# Patient Record
Sex: MALE | Race: WHITE | NOT HISPANIC OR LATINO
[De-identification: names, ages, dates, MRNs, and addresses within clinical notes are randomized per-mention and may not be internally consistent; named-entity substitution may affect disease eponyms.]

---

## 2018-05-16 ENCOUNTER — APPOINTMENT (OUTPATIENT)
Dept: ORTHOPEDIC SURGERY | Facility: CLINIC | Age: 68
End: 2018-05-16
Payer: MEDICARE

## 2018-05-16 VITALS — HEIGHT: 72 IN | BODY MASS INDEX: 27.77 KG/M2 | WEIGHT: 205 LBS

## 2018-05-16 VITALS — DIASTOLIC BLOOD PRESSURE: 76 MMHG | HEART RATE: 93 BPM | SYSTOLIC BLOOD PRESSURE: 121 MMHG

## 2018-05-16 PROCEDURE — 99203 OFFICE O/P NEW LOW 30 MIN: CPT

## 2018-05-23 ENCOUNTER — APPOINTMENT (OUTPATIENT)
Dept: NEUROSURGERY | Facility: CLINIC | Age: 68
End: 2018-05-23

## 2018-05-30 ENCOUNTER — APPOINTMENT (OUTPATIENT)
Dept: PHYSICAL MEDICINE AND REHAB | Facility: CLINIC | Age: 68
End: 2018-05-30

## 2022-06-30 ENCOUNTER — APPOINTMENT (OUTPATIENT)
Dept: UROLOGY | Facility: CLINIC | Age: 72
End: 2022-06-30

## 2022-06-30 VITALS
WEIGHT: 195 LBS | HEIGHT: 72 IN | HEART RATE: 84 BPM | SYSTOLIC BLOOD PRESSURE: 146 MMHG | DIASTOLIC BLOOD PRESSURE: 81 MMHG | BODY MASS INDEX: 26.41 KG/M2

## 2022-06-30 DIAGNOSIS — Z00.00 ENCOUNTER FOR GENERAL ADULT MEDICAL EXAMINATION W/OUT ABNORMAL FINDINGS: ICD-10-CM

## 2022-06-30 DIAGNOSIS — Z78.9 OTHER SPECIFIED HEALTH STATUS: ICD-10-CM

## 2022-06-30 PROCEDURE — 99204 OFFICE O/P NEW MOD 45 MIN: CPT

## 2022-06-30 RX ORDER — LIDOCAINE 5% 700 MG/1
5 PATCH TOPICAL
Qty: 90 | Refills: 0 | Status: ACTIVE | COMMUNITY
Start: 2021-09-23

## 2022-07-02 LAB
ANION GAP SERPL CALC-SCNC: 14 MMOL/L
BUN SERPL-MCNC: 15 MG/DL
CALCIUM SERPL-MCNC: 10.2 MG/DL
CHLORIDE SERPL-SCNC: 99 MMOL/L
CO2 SERPL-SCNC: 28 MMOL/L
CREAT SERPL-MCNC: 1.44 MG/DL
EGFR: 51 ML/MIN/1.73M2
ESTRADIOL SERPL-MCNC: 58 PG/ML
GLUCOSE SERPL-MCNC: 124 MG/DL
LH SERPL-ACNC: 0.3 IU/L
POTASSIUM SERPL-SCNC: 4.5 MMOL/L
PROLACTIN SERPL-MCNC: 11.9 NG/ML
PSA FREE FLD-MCNC: 15 %
PSA FREE SERPL-MCNC: 0.7 NG/ML
PSA SERPL-MCNC: 4.51 NG/ML
SODIUM SERPL-SCNC: 141 MMOL/L

## 2022-07-02 NOTE — ADDENDUM
[FreeTextEntry1] : Entered by Sarah Ron, acting as scribe for Dr. Orlando Lewis.\par \par The documentation recorded by the scribe accurately reflects the service I personally performed and the decisions made by me.

## 2022-07-02 NOTE — LETTER BODY
[FreeTextEntry1] : This patient does not have a PCP. \par \par Reason for Visit: Elevated PSA.\par \par This is a 72 year-old gentleman with a history of chronic back pain and hypogonadism presenting with elevated PSA. The patient notes he is currently on testosterone replacement. He also reports taking Fentanyl for his chronic back pain. The patient reports that he has not had previous prostate biopsy. His current PSA is unknown. The patient denies any hematuria or lower urinary tract symptoms. He denies any aggravating or relieving factors. The patient denies any interference of function. All other review of systems are negative. He has lung cancer in his family medical history. He has previous surgical history. Past medical history, family history and social history were inquired and were noncontributory to current condition. The patient does not drink alcohol. He was a former smoker. Medications and allergies were reviewed. He has no known allergies to medication. \par \par On examination, the patient is in no acute distress. He is alert and oriented follows commands. He has normal mood and affect. He is normocephalic. Neck is supple. Oral no thrush. Respirations are unlabored. His abdomen is soft and nontender. Bladder is nonpalpable. No CVA tenderness. Neurologically he is grossly intact. No peripheral edema. Skin without gross abnormality. He has normal male external genitalia. Normal meatus. Bilateral testes are descended intrascrotally and normal to palpation. On rectal examination, there is normal sphincter tone. The prostate is clinically benign without focal induration or nodularity.\par \par Assessment: Elevated PSA.\par \par I counseled the patient. I discussed with him the risk of occult malignancy. I discussed the various etiologies of PSA elevation, including enlargement of prostate, inflammation or infection, and prostate cancer. He would like to confirm diagnosis by repeating PSA. He will also obtain BMP today. If his PSA remains elevated, then he will undergo prostate MRI for fusion targeted prostate biopsy. I counseled the patient regarding the procedure. The risks and benefits were discussed. Alternatives were given. I answered the patient questions. The patient will take the necessary preparations for the procedure, including fleet edema. The patient will follow-up as directed and will contact me with any questions or concerns. Thank you for the opportunity to participate in the care of this patient, I will keep you updated on his progress. \par \par Plan: Repeat PSA. BMP. Prostate MRI. Fleet edema. Follow up as directed.

## 2022-07-03 LAB
TESTOST FREE SERPL-MCNC: 11.5 PG/ML
TESTOST SERPL-MCNC: 995 NG/DL

## 2022-08-08 ENCOUNTER — OUTPATIENT (OUTPATIENT)
Dept: OUTPATIENT SERVICES | Facility: HOSPITAL | Age: 72
LOS: 1 days | End: 2022-08-08
Payer: MEDICARE

## 2022-08-08 ENCOUNTER — APPOINTMENT (OUTPATIENT)
Dept: MRI IMAGING | Facility: CLINIC | Age: 72
End: 2022-08-08

## 2022-08-08 DIAGNOSIS — R97.20 ELEVATED PROSTATE SPECIFIC ANTIGEN [PSA]: ICD-10-CM

## 2022-08-08 DIAGNOSIS — M54.9 DORSALGIA, UNSPECIFIED: ICD-10-CM

## 2022-08-08 DIAGNOSIS — Z00.00 ENCOUNTER FOR GENERAL ADULT MEDICAL EXAMINATION WITHOUT ABNORMAL FINDINGS: ICD-10-CM

## 2022-08-08 PROCEDURE — 72197 MRI PELVIS W/O & W/DYE: CPT

## 2022-08-08 PROCEDURE — 72197 MRI PELVIS W/O & W/DYE: CPT | Mod: 26,MH

## 2022-08-08 PROCEDURE — A9585: CPT

## 2022-08-12 ENCOUNTER — NON-APPOINTMENT (OUTPATIENT)
Age: 72
End: 2022-08-12

## 2022-08-13 DIAGNOSIS — M54.2 CERVICALGIA: ICD-10-CM

## 2022-08-19 ENCOUNTER — OUTPATIENT (OUTPATIENT)
Dept: OUTPATIENT SERVICES | Facility: HOSPITAL | Age: 72
LOS: 1 days | End: 2022-08-19
Payer: MEDICARE

## 2022-08-19 DIAGNOSIS — Z00.8 ENCOUNTER FOR OTHER GENERAL EXAMINATION: ICD-10-CM

## 2022-08-19 PROCEDURE — 76377 3D RENDER W/INTRP POSTPROCES: CPT | Mod: 26

## 2022-08-19 PROCEDURE — C8001: CPT

## 2022-08-22 ENCOUNTER — NON-APPOINTMENT (OUTPATIENT)
Age: 72
End: 2022-08-22

## 2022-08-28 DIAGNOSIS — M54.9 DORSALGIA, UNSPECIFIED: ICD-10-CM

## 2022-08-29 ENCOUNTER — APPOINTMENT (OUTPATIENT)
Dept: UROLOGY | Facility: CLINIC | Age: 72
End: 2022-08-29

## 2022-08-29 ENCOUNTER — OUTPATIENT (OUTPATIENT)
Dept: OUTPATIENT SERVICES | Facility: HOSPITAL | Age: 72
LOS: 1 days | End: 2022-08-29
Payer: MEDICARE

## 2022-08-29 VITALS
BODY MASS INDEX: 26.41 KG/M2 | WEIGHT: 195 LBS | DIASTOLIC BLOOD PRESSURE: 72 MMHG | SYSTOLIC BLOOD PRESSURE: 123 MMHG | HEIGHT: 72 IN | HEART RATE: 62 BPM | RESPIRATION RATE: 17 BRPM | OXYGEN SATURATION: 98 %

## 2022-08-29 VITALS — SYSTOLIC BLOOD PRESSURE: 147 MMHG | DIASTOLIC BLOOD PRESSURE: 70 MMHG | HEART RATE: 76 BPM

## 2022-08-29 DIAGNOSIS — R35.0 FREQUENCY OF MICTURITION: ICD-10-CM

## 2022-08-29 DIAGNOSIS — R97.20 ELEVATED PROSTATE SPECIFIC ANTIGEN [PSA]: ICD-10-CM

## 2022-08-29 PROCEDURE — 76872 US TRANSRECTAL: CPT

## 2022-08-29 PROCEDURE — 55700: CPT

## 2022-08-29 PROCEDURE — 76942 ECHO GUIDE FOR BIOPSY: CPT | Mod: 26,59

## 2022-08-29 PROCEDURE — 76872 US TRANSRECTAL: CPT | Mod: 26

## 2022-08-29 PROCEDURE — 76377 3D RENDER W/INTRP POSTPROCES: CPT | Mod: 26

## 2022-08-30 ENCOUNTER — NON-APPOINTMENT (OUTPATIENT)
Age: 72
End: 2022-08-30

## 2022-09-08 ENCOUNTER — APPOINTMENT (OUTPATIENT)
Dept: UROLOGY | Facility: CLINIC | Age: 72
End: 2022-09-08

## 2022-09-08 PROCEDURE — 99214 OFFICE O/P EST MOD 30 MIN: CPT

## 2022-09-11 NOTE — LETTER BODY
[FreeTextEntry1] : Orlando Genao MD\par [address]\[place] [address]\par [phone]\par \par Reason for Visit: Elevated PSA.Prostate cancer.\par \par This is a 72 year-old gentleman with a history of chronic back pain and hypogonadism presenting with elevated PSA. The patient notes he is currently on testosterone replacement. He also reports taking Fentanyl for his chronic back pain. The patient underwent prostate MRI in August 2022 which demonstrated PI-RADS 4. He underwent a prostate biopsy two weeks ago which demonstrated [place] 6 and [place] 7 prostate cancer in 5 of 15 cores. The patient returns today for follow up. Since he was last seen, the patient denies any hematuria or lower urinary tract symptoms. The patient denies any interference of function. All other review of systems are negative. He has lung cancer in his family medical history. He has previous surgical history. Past medical history, family history and social history were inquired and were noncontributory to current condition. The patient does not drink alcohol. He was a former smoker. Medications and allergies were reviewed. He has no known allergies to medication. \par \par On examination, the patient is in no acute distress. He is alert and oriented follows commands. He has normal mood and affect. He is normocephalic. Neck is supple. Oral no thrush. Respirations are unlabored. His abdomen is soft and nontender. Bladder is nonpalpable. No CVA tenderness. Neurologically he is grossly intact. No peripheral edema. Skin without gross abnormality. He has normal male external genitalia. Normal meatus. Bilateral testes are descended intrascrotally and normal to palpation. On rectal examination, there is normal sphincter tone. The prostate is clinically benign without focal induration or nodularity.\par \par I personally reviewed prostate biopsy results with the patient today and results demonstrated [place] 6 and [place] 7 prostate cancer in 5 of 15 cores.\par \par Assessment: Elevated PSA.  Prostate cancer.  Testosterone replacement.\par \par I counseled the patient. His biopsy in August demonstrated Madison 6 and [place] 7 prostate cancer in 5 of 15 cores. I recommended the patient consider radiation versus active surveillance. I recommended he see Dr. Vanegas for evaluation for radiation.    I also encouraged him to discuss his diagnosis of prostate cancer with his endocrinologist.  He should consider reducing his dose of testosterone. The patient will follow-up as directed and will contact me with any questions or concerns. Thank you for the opportunity to participate in the care of this patient, I will keep you updated on his progress. \par \par Plan: Follow up with Dr. Vanegas. Follow up as directed.   Follow-up endocrinology regarding testosterone dosing.

## 2022-09-11 NOTE — HISTORY OF PRESENT ILLNESS
[FreeTextEntry1] : Follow-up prostate cancer.  Biopsy demonstrated [place] 6 and Madison 7 prostate cancer in 5 of 15 cores.\par \par Patient consider radiation versus active surveillance.  See Dr. Vanegas for evaluation for radiation.\par \par Please refer to URO Consult note

## 2022-09-13 PROBLEM — E29.1 HYPOGONADISM IN MALE: Status: RESOLVED | Noted: 2022-09-13 | Resolved: 2022-09-13

## 2022-09-13 RX ORDER — AMOXICILLIN 875 MG/1
875 TABLET, FILM COATED ORAL
Qty: 14 | Refills: 0 | Status: DISCONTINUED | COMMUNITY
Start: 2022-05-27 | End: 2022-09-13

## 2022-09-13 RX ORDER — SODIUM PHOSPHATE, DIBASIC AND SODIUM PHOSPHATE, MONOBASIC 7; 19 G/230ML; G/230ML
ENEMA RECTAL
Qty: 1 | Refills: 0 | Status: DISCONTINUED | COMMUNITY
Start: 2022-08-13 | End: 2022-09-13

## 2022-09-13 RX ORDER — DIAZEPAM 5 MG/1
5 TABLET ORAL
Qty: 2 | Refills: 0 | Status: DISCONTINUED | COMMUNITY
Start: 2022-05-26 | End: 2022-09-13

## 2022-09-23 ENCOUNTER — APPOINTMENT (OUTPATIENT)
Dept: RADIATION ONCOLOGY | Facility: CLINIC | Age: 72
End: 2022-09-23

## 2022-09-23 ENCOUNTER — NON-APPOINTMENT (OUTPATIENT)
Age: 72
End: 2022-09-23

## 2022-09-23 VITALS
OXYGEN SATURATION: 97 % | HEIGHT: 72 IN | RESPIRATION RATE: 18 BRPM | HEART RATE: 74 BPM | TEMPERATURE: 97.16 F | WEIGHT: 202.38 LBS | DIASTOLIC BLOOD PRESSURE: 81 MMHG | BODY MASS INDEX: 27.41 KG/M2 | SYSTOLIC BLOOD PRESSURE: 144 MMHG

## 2022-09-23 DIAGNOSIS — E29.1 TESTICULAR HYPOFUNCTION: ICD-10-CM

## 2022-09-23 DIAGNOSIS — Z80.42 FAMILY HISTORY OF MALIGNANT NEOPLASM OF PROSTATE: ICD-10-CM

## 2022-09-23 PROCEDURE — 99204 OFFICE O/P NEW MOD 45 MIN: CPT | Mod: 25

## 2022-09-23 NOTE — REVIEW OF SYSTEMS
[Loss of Hearing] : loss of hearing [Constipation] : constipation [Nocturia] : nocturia [Urinary Frequency] : urinary frequency [Negative] : Allergic/Immunologic [Fever] : no fever [Chills] : no chills [Night Sweats] : no night sweats [Fatigue] : no fatigue [Recent Change In Weight] : ~T no recent weight change [Chest Pain] : no chest pain [Palpitations] : no palpitations [Lower Ext Edema] : no lower extremity edema [Wheezing] : no wheezing [Cough] : no cough [SOB on Exertion] : no shortness of breath during exertion [Abdominal Pain] : no abdominal pain [Vomiting] : no vomiting [Diarrhea] : no diarrhea [Disturbance Of Gait] : no gait disturbance [Skin Rash] : no skin rash [FreeTextEntry7] : has one BM a day

## 2022-09-23 NOTE — VITALS
[Maximal Pain Intensity: 8/10] : 8/10 [Least Pain Intensity: 2/10] : 2/10 [70: Cares for self; unalbe to carry on normal activity or do active work.] : 70: Cares for self; unable to carry on normal activity or do active work. [Date: ____________] : Patient's last distress assessment performed on [unfilled]. [0 - No Distress] : Distress Level: 0 [ECOG Performance Status: 2 - Ambulatory and capable of all self care but unable to carry out any work activities] : Performance Status: 2 - Ambulatory and capable of all self care but unable to carry out any work activities. Up and about more than 50% of waking hours

## 2022-09-23 NOTE — HISTORY OF PRESENT ILLNESS
[FreeTextEntry1] : Mr. Dykes is a 72 year old male who is being seen in consultation for consideration of radiation therapy for prostate cancer. 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  On Fentanyl for pain (Dr Melchor Gonzalez) and is on testosterone replacement.*\par \par PSA Trend:\par 6/30/22 -  4.51 ng/mL           Testosterone 995.0 ng/dL\par \par \par Oncologic History:  \par \par Patient had his annual visit and underwent routine blood work which showed elevated PSA (4.2 ng/mL).  On 6/30/22, Patient saw Dr. GEOVANNI Lewis (urology) in consultation for elevated PSA (result not known).  PSA repeated and was 4.54 ng/mL.  \par \par MRI Prostate on 8/8/22 showed Volume 35 mL.  Right, posterior lateral (PZpl), midgland, peripheral zone lesion as detailed in report.  Tumor abuts but does not deform capsule.  No extraprostatic extension, seminal vesicle invasion, or pelvic lymphadenopathy.  PIRADS 4 \par \par On 8/28/22, he underwent Prostate Biopsy which Pathology  showed  Adenocarcinoma of the Prostate, [place] 3+4=7 in 1/16 cores, [place] 3+3=6 in 4/16 cores, 60% max involvement\par \par On 9/8/22, he saw Dr. Lewis (urology) in follow up to discuss biopsy results.  Referral to radiation oncology was made.  Also to follow up with endocrinologist regarding his testosterone replacement dosing. \par \par On 9/23/22, patient presents in consultation to discuss radiation options.  He feels well but notes urinary frequency since the biopsy, gets up at night 2-3 times to urinate with increased urinary urgency but denies dysuria, fevers/chills.  \par

## 2022-09-23 NOTE — PHYSICAL EXAM
[Sclera] : the sclera and conjunctiva were normal [Extraocular Movements] : extraocular movements were intact [Outer Ear] : the ears and nose were normal in appearance [] : no respiratory distress [Normal] : no joint swelling, no clubbing or cyanosis of the fingernails and muscle strength and tone were normal [de-identified] : GERRY [de-identified] : previous gall bladder removal and hernia operation

## 2022-09-23 NOTE — DISEASE MANAGEMENT
[BiopsyDate] : 8/28/22 [MeasuredProstateVolume] : 35 [TotalCores] : 16 [TotalPositiveCores] : 5 [MaxCoreInvolvement] : 60 [II] : II

## 2023-03-13 ENCOUNTER — APPOINTMENT (OUTPATIENT)
Dept: UROLOGY | Facility: CLINIC | Age: 73
End: 2023-03-13
Payer: MEDICARE

## 2023-03-13 PROCEDURE — 99214 OFFICE O/P EST MOD 30 MIN: CPT

## 2023-03-13 NOTE — LETTER BODY
[FreeTextEntry1] : Orlando Genao MD\par [address]\[place] [address]\par [phone]\par \par Reason for Visit: Elevated PSA.Prostate cancer. Hypergonadism.\par \par This is a 72 year-old gentleman with a history of chronic back pain and hypogonadism on testosterone replacement presenting with  prostate cancer and elevated PSA. The patient notes he is currently on testosterone replacement for hypogonadism. He also reports taking Fentanyl for his chronic back pain. The patient underwent prostate MRI in August 2022 which demonstrated PI-RADS 4. He underwent a prostate biopsy subsequently which demonstrated Madison 6 and [place] 7 prostate cancer in 5 of 15 cores. The patient returns today for follow up. He has decided to proceed with active surveillance of his prostate cancer. Since he was last seen, the patient denies any hematuria or lower urinary tract symptoms.  he has decreased his testosterone supplementation to 150 mg every 2 weeks.The patient denies any interference of function. All other review of systems are negative. He has lung cancer in his family medical history. He has mh surgical history. Past medical history, family history and social history were inquired and were noncontributory to current condition. The patient does not drink alcohol. He was a former smoker. Medications and allergies were reviewed. He has no known allergies to medication. \par \par On examination, the patient is in no acute distress. He is alert and oriented follows commands. He has normal mood and affect. He is normocephalic. Neck is supple. Oral no thrush. Respirations are unlabored. His abdomen is soft and nontender. Bladder is nonpalpable. No CVA tenderness. Neurologically he is grossly intact. No peripheral edema. Skin without gross abnormality. He has normal male external genitalia. Normal meatus. Bilateral testes are descended intrascrotally and normal to palpation. On rectal examination, there is normal sphincter tone. The prostate is clinically benign without focal induration or nodularity.\par \par His PSA has decreased from 4.5 in June 2022 to 3.3 currently, which is within normal limits. \par \par His testosterone supplementation has decreased from 200 to 150 every 2 weeks, with increased duration between injections. \par \par Assessment: Elevated PSA. Prostate cancer. Testosterone replacement.\par \par I counseled the patient.  He declines surgery and radiation. He will follow up in 6 months to continue active surveillance. In terms of his hypogonadism, I encouraged the patient to discuss his LH level with his endocrinologist to evaluate if it is primary or secondary hypogonadism.  his PSA decreased when he decrease his testosterone supplementation. I recommended that he consider stopping testosterone replacement in order to reduce the risk of his prostate cancer progression. The patient will follow-up as directed and will contact me with any questions or concerns. Thank you for the opportunity to participate in the care of this patient, I will keep you updated on his progress. \par \par Plan: Follow-up with endocrinology. Consider DC testosterone replacement. Follow up in 6 months.

## 2023-03-13 NOTE — ADDENDUM
[FreeTextEntry1] : Entered by Macho Cui, acting as scribe for Dr. Orlando Lewis.\par The documentation recorded by the scribe accurately reflects the service I personally performed and the decisions made by me.

## 2023-09-18 ENCOUNTER — APPOINTMENT (OUTPATIENT)
Dept: UROLOGY | Facility: CLINIC | Age: 73
End: 2023-09-18
Payer: MEDICARE

## 2023-09-18 PROCEDURE — 99214 OFFICE O/P EST MOD 30 MIN: CPT

## 2023-09-20 LAB
ANION GAP SERPL CALC-SCNC: 16 MMOL/L
BUN SERPL-MCNC: 14 MG/DL
CALCIUM SERPL-MCNC: 9.8 MG/DL
CHLORIDE SERPL-SCNC: 102 MMOL/L
CO2 SERPL-SCNC: 22 MMOL/L
CREAT SERPL-MCNC: 1.61 MG/DL
EGFR: 44 ML/MIN/1.73M2
GLUCOSE SERPL-MCNC: 148 MG/DL
POTASSIUM SERPL-SCNC: 4.5 MMOL/L
PSA FREE FLD-MCNC: 14 %
PSA FREE SERPL-MCNC: 0.83 NG/ML
PSA SERPL-MCNC: 5.73 NG/ML
SODIUM SERPL-SCNC: 140 MMOL/L
TESTOST SERPL-MCNC: 804 NG/DL

## 2023-10-03 ENCOUNTER — NON-APPOINTMENT (OUTPATIENT)
Age: 73
End: 2023-10-03

## 2023-10-12 ENCOUNTER — NON-APPOINTMENT (OUTPATIENT)
Age: 73
End: 2023-10-12

## 2023-10-12 DIAGNOSIS — N50.812 RIGHT TESTICULAR PAIN: ICD-10-CM

## 2023-10-12 DIAGNOSIS — N50.811 RIGHT TESTICULAR PAIN: ICD-10-CM

## 2023-10-14 LAB
APPEARANCE: CLEAR
BACTERIA UR CULT: NORMAL
BACTERIA: NEGATIVE /HPF
BILIRUBIN URINE: NEGATIVE
BLOOD URINE: NEGATIVE
CAST: 2 /LPF
COLOR: YELLOW
EPITHELIAL CELLS: 0 /HPF
GLUCOSE QUALITATIVE U: NEGATIVE MG/DL
KETONES URINE: NEGATIVE MG/DL
LEUKOCYTE ESTERASE URINE: ABNORMAL
MICROSCOPIC-UA: NORMAL
NITRITE URINE: NEGATIVE
PH URINE: 5.5
PROTEIN URINE: NORMAL MG/DL
RED BLOOD CELLS URINE: 0 /HPF
SPECIFIC GRAVITY URINE: 1.02
UROBILINOGEN URINE: 1 MG/DL
WHITE BLOOD CELLS URINE: 1 /HPF

## 2023-10-18 ENCOUNTER — APPOINTMENT (OUTPATIENT)
Dept: MRI IMAGING | Facility: CLINIC | Age: 73
End: 2023-10-18
Payer: MEDICARE

## 2023-10-18 ENCOUNTER — RESULT REVIEW (OUTPATIENT)
Age: 73
End: 2023-10-18

## 2023-10-18 ENCOUNTER — OUTPATIENT (OUTPATIENT)
Dept: OUTPATIENT SERVICES | Facility: HOSPITAL | Age: 73
LOS: 1 days | End: 2023-10-18
Payer: MEDICARE

## 2023-10-18 ENCOUNTER — APPOINTMENT (OUTPATIENT)
Dept: ULTRASOUND IMAGING | Facility: CLINIC | Age: 73
End: 2023-10-18
Payer: MEDICARE

## 2023-10-18 DIAGNOSIS — N50.811 RIGHT TESTICULAR PAIN: ICD-10-CM

## 2023-10-18 PROCEDURE — 76870 US EXAM SCROTUM: CPT | Mod: 26

## 2023-10-18 PROCEDURE — 72197 MRI PELVIS W/O & W/DYE: CPT | Mod: 26,MH

## 2023-10-18 PROCEDURE — 76870 US EXAM SCROTUM: CPT

## 2023-10-18 PROCEDURE — 76498 UNLISTED MR PROCEDURE: CPT

## 2023-10-18 PROCEDURE — 76498P: CUSTOM | Mod: 26,MH

## 2023-10-18 PROCEDURE — A9585: CPT

## 2023-10-18 PROCEDURE — 72197 MRI PELVIS W/O & W/DYE: CPT

## 2023-10-23 ENCOUNTER — APPOINTMENT (OUTPATIENT)
Dept: UROLOGY | Facility: CLINIC | Age: 73
End: 2023-10-23

## 2023-10-23 ENCOUNTER — NON-APPOINTMENT (OUTPATIENT)
Age: 73
End: 2023-10-23

## 2023-10-23 ENCOUNTER — OUTPATIENT (OUTPATIENT)
Dept: OUTPATIENT SERVICES | Facility: HOSPITAL | Age: 73
LOS: 1 days | End: 2023-10-23
Payer: SELF-PAY

## 2023-10-23 DIAGNOSIS — Z00.8 ENCOUNTER FOR OTHER GENERAL EXAMINATION: ICD-10-CM

## 2023-10-23 PROCEDURE — C8001: CPT

## 2023-10-26 ENCOUNTER — APPOINTMENT (OUTPATIENT)
Dept: UROLOGY | Facility: CLINIC | Age: 73
End: 2023-10-26
Payer: MEDICARE

## 2023-10-26 ENCOUNTER — OUTPATIENT (OUTPATIENT)
Dept: OUTPATIENT SERVICES | Facility: HOSPITAL | Age: 73
LOS: 1 days | End: 2023-10-26
Payer: MEDICARE

## 2023-10-26 VITALS — HEART RATE: 85 BPM | SYSTOLIC BLOOD PRESSURE: 133 MMHG | DIASTOLIC BLOOD PRESSURE: 84 MMHG

## 2023-10-26 VITALS
RESPIRATION RATE: 16 BRPM | TEMPERATURE: 207.5 F | SYSTOLIC BLOOD PRESSURE: 128 MMHG | OXYGEN SATURATION: 97 % | DIASTOLIC BLOOD PRESSURE: 84 MMHG | HEART RATE: 91 BPM

## 2023-10-26 DIAGNOSIS — R35.0 FREQUENCY OF MICTURITION: ICD-10-CM

## 2023-10-26 DIAGNOSIS — M47.812 SPONDYLOSIS W/OUT MYELOPATHY OR RADICULOPATHY, CERVICAL REGION: ICD-10-CM

## 2023-10-26 PROCEDURE — 55700: CPT | Mod: 22

## 2023-10-26 PROCEDURE — 76377 3D RENDER W/INTRP POSTPROCES: CPT | Mod: 26

## 2023-10-26 PROCEDURE — 76942 ECHO GUIDE FOR BIOPSY: CPT | Mod: 26,59

## 2023-10-26 PROCEDURE — 55700: CPT

## 2023-10-26 PROCEDURE — 76942 ECHO GUIDE FOR BIOPSY: CPT | Mod: 59

## 2023-10-27 DIAGNOSIS — C61 MALIGNANT NEOPLASM OF PROSTATE: ICD-10-CM

## 2023-10-30 LAB — PROSTATE BIOPSY: NORMAL

## 2023-11-06 ENCOUNTER — APPOINTMENT (OUTPATIENT)
Dept: UROLOGY | Facility: CLINIC | Age: 73
End: 2023-11-06
Payer: MEDICARE

## 2023-11-06 PROCEDURE — 99214 OFFICE O/P EST MOD 30 MIN: CPT

## 2023-11-06 RX ORDER — AMOXICILLIN AND CLAVULANATE POTASSIUM 875; 125 MG/1; MG/1
875-125 TABLET, COATED ORAL
Qty: 6 | Refills: 0 | Status: COMPLETED | COMMUNITY
Start: 2022-09-26 | End: 2023-11-06

## 2023-11-06 RX ORDER — SODIUM PHOSPHATE, DIBASIC AND SODIUM PHOSPHATE, MONOBASIC 7; 19 G/230ML; G/230ML
ENEMA RECTAL
Qty: 1 | Refills: 0 | Status: COMPLETED | COMMUNITY
Start: 2023-09-20 | End: 2023-11-06

## 2023-11-06 RX ORDER — SODIUM PHOSPHATE, DIBASIC AND SODIUM PHOSPHATE, MONOBASIC 7; 19 G/230ML; G/230ML
ENEMA RECTAL
Qty: 1 | Refills: 0 | Status: COMPLETED | COMMUNITY
Start: 2022-09-26 | End: 2023-11-06

## 2024-01-11 LAB — PROSTATE BIOPSY: NORMAL

## 2024-01-27 ENCOUNTER — INPATIENT (INPATIENT)
Facility: HOSPITAL | Age: 74
LOS: 3 days | Discharge: HOME | DRG: 324 | End: 2024-01-31
Attending: STUDENT IN AN ORGANIZED HEALTH CARE EDUCATION/TRAINING PROGRAM | Admitting: STUDENT IN AN ORGANIZED HEALTH CARE EDUCATION/TRAINING PROGRAM
Payer: MEDICARE

## 2024-01-27 VITALS
HEIGHT: 72 IN | WEIGHT: 184.97 LBS | HEART RATE: 65 BPM | RESPIRATION RATE: 20 BRPM | SYSTOLIC BLOOD PRESSURE: 139 MMHG | DIASTOLIC BLOOD PRESSURE: 81 MMHG | OXYGEN SATURATION: 99 % | TEMPERATURE: 98 F

## 2024-01-27 DIAGNOSIS — R07.9 CHEST PAIN, UNSPECIFIED: ICD-10-CM

## 2024-01-27 DIAGNOSIS — I35.0 NONRHEUMATIC AORTIC (VALVE) STENOSIS: ICD-10-CM

## 2024-01-27 DIAGNOSIS — C61 MALIGNANT NEOPLASM OF PROSTATE: ICD-10-CM

## 2024-01-27 DIAGNOSIS — K21.9 GASTRO-ESOPHAGEAL REFLUX DISEASE WITHOUT ESOPHAGITIS: ICD-10-CM

## 2024-01-27 LAB
ALBUMIN SERPL ELPH-MCNC: 4.3 G/DL — SIGNIFICANT CHANGE UP (ref 3.3–5)
ALP SERPL-CCNC: 54 U/L — SIGNIFICANT CHANGE UP (ref 40–120)
ALT FLD-CCNC: 24 U/L — SIGNIFICANT CHANGE UP (ref 10–45)
ANION GAP SERPL CALC-SCNC: 10 MMOL/L — SIGNIFICANT CHANGE UP (ref 5–17)
APTT BLD: 29.8 SEC — SIGNIFICANT CHANGE UP (ref 24.5–35.6)
AST SERPL-CCNC: 28 U/L — SIGNIFICANT CHANGE UP (ref 10–40)
BASE EXCESS BLDV CALC-SCNC: 3.3 MMOL/L — HIGH (ref -2–3)
BASE EXCESS BLDV CALC-SCNC: 4.1 MMOL/L — HIGH (ref -2–3)
BASOPHILS # BLD AUTO: 0.01 K/UL — SIGNIFICANT CHANGE UP (ref 0–0.2)
BASOPHILS NFR BLD AUTO: 0.1 % — SIGNIFICANT CHANGE UP (ref 0–2)
BILIRUB SERPL-MCNC: 0.3 MG/DL — SIGNIFICANT CHANGE UP (ref 0.2–1.2)
BUN SERPL-MCNC: 22 MG/DL — SIGNIFICANT CHANGE UP (ref 7–23)
CA-I SERPL-SCNC: 1.23 MMOL/L — SIGNIFICANT CHANGE UP (ref 1.15–1.33)
CA-I SERPL-SCNC: 1.28 MMOL/L — SIGNIFICANT CHANGE UP (ref 1.15–1.33)
CALCIUM SERPL-MCNC: 9.7 MG/DL — SIGNIFICANT CHANGE UP (ref 8.4–10.5)
CHLORIDE BLDV-SCNC: 104 MMOL/L — SIGNIFICANT CHANGE UP (ref 96–108)
CHLORIDE BLDV-SCNC: 105 MMOL/L — SIGNIFICANT CHANGE UP (ref 96–108)
CHLORIDE SERPL-SCNC: 106 MMOL/L — SIGNIFICANT CHANGE UP (ref 96–108)
CK MB CFR SERPL CALC: 4.6 NG/ML — SIGNIFICANT CHANGE UP (ref 0–6.7)
CO2 BLDV-SCNC: 31 MMOL/L — HIGH (ref 22–26)
CO2 BLDV-SCNC: 32 MMOL/L — HIGH (ref 22–26)
CO2 SERPL-SCNC: 27 MMOL/L — SIGNIFICANT CHANGE UP (ref 22–31)
CREAT SERPL-MCNC: 1.38 MG/DL — HIGH (ref 0.5–1.3)
D DIMER BLD IA.RAPID-MCNC: 204 NG/ML DDU — SIGNIFICANT CHANGE UP
EGFR: 53 ML/MIN/1.73M2 — LOW
EOSINOPHIL # BLD AUTO: 0.18 K/UL — SIGNIFICANT CHANGE UP (ref 0–0.5)
EOSINOPHIL NFR BLD AUTO: 1.9 % — SIGNIFICANT CHANGE UP (ref 0–6)
GAS PNL BLDV: 138 MMOL/L — SIGNIFICANT CHANGE UP (ref 136–145)
GAS PNL BLDV: 138 MMOL/L — SIGNIFICANT CHANGE UP (ref 136–145)
GAS PNL BLDV: SIGNIFICANT CHANGE UP
GLUCOSE BLDV-MCNC: 103 MG/DL — HIGH (ref 70–99)
GLUCOSE BLDV-MCNC: 98 MG/DL — SIGNIFICANT CHANGE UP (ref 70–99)
GLUCOSE SERPL-MCNC: 100 MG/DL — HIGH (ref 70–99)
HCO3 BLDV-SCNC: 30 MMOL/L — HIGH (ref 22–29)
HCO3 BLDV-SCNC: 30 MMOL/L — HIGH (ref 22–29)
HCT VFR BLD CALC: 37 % — LOW (ref 39–50)
HCT VFR BLDA CALC: 36 % — LOW (ref 39–51)
HCT VFR BLDA CALC: 38 % — LOW (ref 39–51)
HGB BLD CALC-MCNC: 12.1 G/DL — LOW (ref 12.6–17.4)
HGB BLD CALC-MCNC: 12.8 G/DL — SIGNIFICANT CHANGE UP (ref 12.6–17.4)
HGB BLD-MCNC: 12.4 G/DL — LOW (ref 13–17)
IMM GRANULOCYTES NFR BLD AUTO: 0.3 % — SIGNIFICANT CHANGE UP (ref 0–0.9)
INR BLD: 1.13 RATIO — SIGNIFICANT CHANGE UP (ref 0.85–1.18)
LACTATE BLDV-MCNC: 0.8 MMOL/L — SIGNIFICANT CHANGE UP (ref 0.5–2)
LACTATE BLDV-MCNC: 2.3 MMOL/L — HIGH (ref 0.5–2)
LIDOCAIN IGE QN: 41 U/L — SIGNIFICANT CHANGE UP (ref 7–60)
LYMPHOCYTES # BLD AUTO: 1.72 K/UL — SIGNIFICANT CHANGE UP (ref 1–3.3)
LYMPHOCYTES # BLD AUTO: 18.6 % — SIGNIFICANT CHANGE UP (ref 13–44)
MCHC RBC-ENTMCNC: 29 PG — SIGNIFICANT CHANGE UP (ref 27–34)
MCHC RBC-ENTMCNC: 33.5 GM/DL — SIGNIFICANT CHANGE UP (ref 32–36)
MCV RBC AUTO: 86.7 FL — SIGNIFICANT CHANGE UP (ref 80–100)
MONOCYTES # BLD AUTO: 0.71 K/UL — SIGNIFICANT CHANGE UP (ref 0–0.9)
MONOCYTES NFR BLD AUTO: 7.7 % — SIGNIFICANT CHANGE UP (ref 2–14)
NEUTROPHILS # BLD AUTO: 6.59 K/UL — SIGNIFICANT CHANGE UP (ref 1.8–7.4)
NEUTROPHILS NFR BLD AUTO: 71.4 % — SIGNIFICANT CHANGE UP (ref 43–77)
NRBC # BLD: 0 /100 WBCS — SIGNIFICANT CHANGE UP (ref 0–0)
NT-PROBNP SERPL-SCNC: 261 PG/ML — SIGNIFICANT CHANGE UP (ref 0–300)
PCO2 BLDV: 48 MMHG — SIGNIFICANT CHANGE UP (ref 42–55)
PCO2 BLDV: 53 MMHG — SIGNIFICANT CHANGE UP (ref 42–55)
PH BLDV: 7.36 — SIGNIFICANT CHANGE UP (ref 7.32–7.43)
PH BLDV: 7.4 — SIGNIFICANT CHANGE UP (ref 7.32–7.43)
PLATELET # BLD AUTO: 160 K/UL — SIGNIFICANT CHANGE UP (ref 150–400)
PO2 BLDV: 35 MMHG — SIGNIFICANT CHANGE UP (ref 25–45)
PO2 BLDV: 35 MMHG — SIGNIFICANT CHANGE UP (ref 25–45)
POTASSIUM BLDV-SCNC: 4.1 MMOL/L — SIGNIFICANT CHANGE UP (ref 3.5–5.1)
POTASSIUM BLDV-SCNC: 4.2 MMOL/L — SIGNIFICANT CHANGE UP (ref 3.5–5.1)
POTASSIUM SERPL-MCNC: 4.5 MMOL/L — SIGNIFICANT CHANGE UP (ref 3.5–5.3)
POTASSIUM SERPL-SCNC: 4.5 MMOL/L — SIGNIFICANT CHANGE UP (ref 3.5–5.3)
PROT SERPL-MCNC: 6.7 G/DL — SIGNIFICANT CHANGE UP (ref 6–8.3)
PROTHROM AB SERPL-ACNC: 11.8 SEC — SIGNIFICANT CHANGE UP (ref 9.5–13)
RBC # BLD: 4.27 M/UL — SIGNIFICANT CHANGE UP (ref 4.2–5.8)
RBC # FLD: 11.9 % — SIGNIFICANT CHANGE UP (ref 10.3–14.5)
SAO2 % BLDV: 47.5 % — LOW (ref 67–88)
SAO2 % BLDV: 61 % — LOW (ref 67–88)
SODIUM SERPL-SCNC: 143 MMOL/L — SIGNIFICANT CHANGE UP (ref 135–145)
TROPONIN T, HIGH SENSITIVITY RESULT: 26 NG/L — SIGNIFICANT CHANGE UP (ref 0–51)
TROPONIN T, HIGH SENSITIVITY RESULT: 29 NG/L — SIGNIFICANT CHANGE UP (ref 0–51)
WBC # BLD: 9.24 K/UL — SIGNIFICANT CHANGE UP (ref 3.8–10.5)
WBC # FLD AUTO: 9.24 K/UL — SIGNIFICANT CHANGE UP (ref 3.8–10.5)

## 2024-01-27 PROCEDURE — 99222 1ST HOSP IP/OBS MODERATE 55: CPT

## 2024-01-27 PROCEDURE — 71045 X-RAY EXAM CHEST 1 VIEW: CPT | Mod: 26

## 2024-01-27 PROCEDURE — 99285 EMERGENCY DEPT VISIT HI MDM: CPT | Mod: GC

## 2024-01-27 RX ORDER — PANTOPRAZOLE SODIUM 20 MG/1
40 TABLET, DELAYED RELEASE ORAL
Refills: 0 | Status: DISCONTINUED | OUTPATIENT
Start: 2024-01-27 | End: 2024-01-31

## 2024-01-27 RX ORDER — HEPARIN SODIUM 5000 [USP'U]/ML
5000 INJECTION INTRAVENOUS; SUBCUTANEOUS EVERY 8 HOURS
Refills: 0 | Status: DISCONTINUED | OUTPATIENT
Start: 2024-01-27 | End: 2024-01-29

## 2024-01-27 RX ORDER — ONDANSETRON 8 MG/1
4 TABLET, FILM COATED ORAL EVERY 8 HOURS
Refills: 0 | Status: DISCONTINUED | OUTPATIENT
Start: 2024-01-27 | End: 2024-01-31

## 2024-01-27 RX ORDER — LIDOCAINE 4 G/100G
1 CREAM TOPICAL EVERY 24 HOURS
Refills: 0 | Status: DISCONTINUED | OUTPATIENT
Start: 2024-01-27 | End: 2024-01-30

## 2024-01-27 RX ORDER — ASPIRIN/CALCIUM CARB/MAGNESIUM 324 MG
162 TABLET ORAL ONCE
Refills: 0 | Status: COMPLETED | OUTPATIENT
Start: 2024-01-27 | End: 2024-01-27

## 2024-01-27 RX ORDER — LANOLIN ALCOHOL/MO/W.PET/CERES
3 CREAM (GRAM) TOPICAL AT BEDTIME
Refills: 0 | Status: DISCONTINUED | OUTPATIENT
Start: 2024-01-27 | End: 2024-01-31

## 2024-01-27 RX ORDER — TAMSULOSIN HYDROCHLORIDE 0.4 MG/1
0.4 CAPSULE ORAL AT BEDTIME
Refills: 0 | Status: DISCONTINUED | OUTPATIENT
Start: 2024-01-27 | End: 2024-01-29

## 2024-01-27 RX ORDER — ACETAMINOPHEN 500 MG
650 TABLET ORAL EVERY 6 HOURS
Refills: 0 | Status: DISCONTINUED | OUTPATIENT
Start: 2024-01-27 | End: 2024-01-31

## 2024-01-27 RX ORDER — CALCIUM CARBONATE 500(1250)
1 TABLET ORAL ONCE
Refills: 0 | Status: COMPLETED | OUTPATIENT
Start: 2024-01-27 | End: 2024-01-27

## 2024-01-27 RX ADMIN — Medication 1 TABLET(S): at 16:23

## 2024-01-27 RX ADMIN — HEPARIN SODIUM 5000 UNIT(S): 5000 INJECTION INTRAVENOUS; SUBCUTANEOUS at 21:34

## 2024-01-27 RX ADMIN — PANTOPRAZOLE SODIUM 40 MILLIGRAM(S): 20 TABLET, DELAYED RELEASE ORAL at 16:23

## 2024-01-27 RX ADMIN — TAMSULOSIN HYDROCHLORIDE 0.4 MILLIGRAM(S): 0.4 CAPSULE ORAL at 21:35

## 2024-01-27 RX ADMIN — Medication 162 MILLIGRAM(S): at 03:53

## 2024-01-27 NOTE — ED PROVIDER NOTE - ATTENDING CONTRIBUTION TO CARE
Attending MD Hopkins: I personally have seen and examined this patient.  Resident note reviewed and agree on plan of care and except where noted.  See below for details.     Seen in [facility]    73M with PMH/PSH including AS, GERD, prostate ca presents to the ED with chest pain, worse than usual.  Reports chronic chest pain, burning, similar to heartburn.  Reports feels it in upper chest.  Reports chronic shortness of breath with exertion, unchanged.  Denies palpitations, syncope.  Denies positional, exertional.  Reports is scheduled for aortic valve surgery on 1/30/24.  Denies fevers, chills  Denies change in vision, headache.  Denies nausea, vomiting, diarrhea, urinary complaints.      Exam:   General: NAD  HENT: head NCAT, airway patent  Eyes: anicteric, no conjunctival injection   Lungs: lungs CTAB with good inspiratory effort, no wheezing, no rhonchi, no rales  Cardiac: +S1S2, no obvious r/g, +murmur  GI: abdomen soft with +BS, NT, ND  : no CVAT  MSK: ranging neck and extremities freely  Neuro: moving all extremities spontaneously, nonfocal  Psych: normal mood and affect    A/P: 73M with worsened chest pain, upcoming AS surgery, DDx includes ACS, worsening of known pathology, GERD, MSK, will obtain labs, EKG, CXR, cardiac monitor, cards

## 2024-01-27 NOTE — H&P ADULT - HISTORY OF PRESENT ILLNESS
73M PMH aortic stenosis, GERD and prostate cancer presents to the hospital with chest pain. Patient reports that he has chronic chest pain, however this has been worsening prior to admission. Patient describes pain as burning, and likens discomfort to heartburn, but reports sensation is higher up. Denies chest pressure. Denies radiation. Denies palpitations. Denies syncope or near syncope. Denies radiation of pain. Patient reports that pain is not changed with positional changes.   ROS otherwise noncontributory.   Of note, the patient has a scheduled appointment with cardiology on 1/30 for final planning for possible aortic valve surgery.     In ED, VSS. CBC with mild normocytic anemia. CMP with mild elevation in Cr but otherwise noncontributory. Troponin not elevated 2x. BNP not elevated. EKG with NSR. CXR clear. Patient now for admission for further evaluation and treatment of chest pain.

## 2024-01-27 NOTE — ED ADULT NURSE NOTE - EXTENSIONS OF SELF_ADULT
Subjective:       Patient ID: Cary Guzman is a 38 y.o. female.    Chief Complaint: Annual Exam    HPI   The patient is a 38-year-old who is here today for her annual visit.  Overall, she is doing well.  She has no acute questions or concerns regarding her health.  She is due for her Pap smear.  She has no history of abnormal Pap smears.  She is a  with no complications.  She does have her cycles once a month.  Her cycles usually last for 3-4 days and are usually normal and flow although she did have one day recently with the particularly heavy flow.  Her  has previously had a vasectomy.  She did have her mammogram in September which was normal.  Her immunizations are up-to-date.  She is fasting today for labs    She does need a refill on her Lamictal.  She plans to continue with the Lamictal indefinitely as she doesn't want to take the risk of stopping this medication.  She's not had a seizure since .  She is not interested in seeing the neurologist regularly or having any repeat testing    Review of Systems   Constitutional: Negative for appetite change, chills, diaphoresis, fatigue, fever and unexpected weight change.   HENT: Negative for congestion, dental problem, ear pain, hearing loss, postnasal drip, rhinorrhea, sneezing, sore throat and trouble swallowing.    Eyes: Negative for photophobia, pain, discharge and visual disturbance.   Respiratory: Negative for cough, chest tightness, shortness of breath and wheezing.    Cardiovascular: Negative for chest pain, palpitations and leg swelling.   Gastrointestinal: Negative for abdominal distention, abdominal pain, blood in stool, constipation, diarrhea, nausea and vomiting.   Endocrine: Negative for cold intolerance, heat intolerance, polydipsia and polyuria.   Genitourinary: Negative for dysuria, flank pain, frequency, genital sores, hematuria, menstrual problem and vaginal discharge.   Musculoskeletal: Negative for arthralgias,  joint swelling and myalgias.   Skin: Negative for rash.   Neurological: Negative for dizziness, syncope, light-headedness and headaches.   Hematological: Negative for adenopathy. Does not bruise/bleed easily.   Psychiatric/Behavioral: Negative for dysphoric mood, self-injury, sleep disturbance and suicidal ideas. The patient is not nervous/anxious.        Objective:      Physical Exam   Constitutional: She is oriented to person, place, and time. She appears well-developed and well-nourished.   HENT:   Head: Normocephalic and atraumatic.   Right Ear: Hearing, tympanic membrane, external ear and ear canal normal.   Left Ear: Hearing, tympanic membrane, external ear and ear canal normal.   Nose: Nose normal.   Mouth/Throat: Oropharynx is clear and moist and mucous membranes are normal.   Eyes: Conjunctivae, EOM and lids are normal. Pupils are equal, round, and reactive to light.   Neck: Normal range of motion. Neck supple. Carotid bruit is not present. No thyroid mass and no thyromegaly present.   Cardiovascular: Normal rate, regular rhythm and normal heart sounds.    No murmur heard.  Pulses:       Dorsalis pedis pulses are 2+ on the right side, and 2+ on the left side.        Posterior tibial pulses are 2+ on the right side, and 2+ on the left side.   Pulmonary/Chest: Effort normal and breath sounds normal.   Clear to auscultation bilaterally.     Abdominal: Soft. Normal appearance and bowel sounds are normal. She exhibits no abdominal bruit. There is no hepatosplenomegaly. There is no tenderness.   Genitourinary: Pelvic exam was performed with patient supine. There is no rash or lesion on the right labia. There is no rash or lesion on the left labia.   Genitourinary Comments: External genitalia appear normal.  Speculum is inserted.  Vaginal mucosa is normal.  Cervix is visualized and appears normal.  Pap is taken.  Bimanual exam reveals a normal sized uterus and ovaries which are non tender.   Feet:   Right Foot:  "  Skin Integrity: Positive for warmth and dry skin.   Lymphadenopathy:        Head (right side): No submental, no submandibular, no preauricular, no posterior auricular and no occipital adenopathy present.        Head (left side): No submental, no submandibular, no preauricular and no occipital adenopathy present.     She has no cervical adenopathy.     She has no axillary adenopathy.        Right: No supraclavicular adenopathy present.        Left: No supraclavicular adenopathy present.   Neurological: She is alert and oriented to person, place, and time. She has normal strength. No cranial nerve deficit or sensory deficit.   Skin: Skin is warm, dry and intact. No cyanosis. Nails show no clubbing.   Psychiatric: She has a normal mood and affect. Her speech is normal and behavior is normal. Thought content normal. Cognition and memory are normal.   Breast:  The breasts appear symmetric.  There is no nipple discharge or nipple inversion noted.  There are no masses palpable throughout either breast.  There is no supraclavicular or axillary lymphadenopathy.    Blood pressure 120/72, pulse 87, temperature 98.1 °F (36.7 °C), temperature source Oral, height 5' 4" (1.626 m), weight 72.3 kg (159 lb 6.3 oz), last menstrual period 06/23/2017, SpO2 98 %.Body mass index is 27.36 kg/m².        A/P:  1)  annual exam.  Health maintenance issues and anticipatory guidance issues were discussed.  Immunizations are up-to-date.  We will check fasting labs.  We will contact her with her Pap smear and HPV test results.  She will continue with annual mammography.  2)  seizure disorder.  Well-controlled.  Continue with Lamictal.  We will be checking labs as noted above  " None

## 2024-01-27 NOTE — H&P ADULT - PROBLEM SELECTOR PLAN 3
Takes Silodosin as an outpatient   - Continue as tamsulosin while inpatient   - Continue with home fentanyl patch, last placed on 1/27 in AM

## 2024-01-27 NOTE — ED PROVIDER NOTE - PHYSICAL EXAMINATION
PHYSICAL EXAM:    GENERAL: NAD, appears anxious  HEENT:  Atraumatic  CHEST/LUNG: Chest rise equal bilaterally; CTAB  HEART: Regular rate and rhythm; grade III/VI systolic murmur audible  ABDOMEN: Soft, Nontender, Nondistended  EXTREMITIES:  Extremities warm, no peripheral edema  PSYCH: A&Ox3  SKIN: No obvious rashes or lesions  NEUROLOGY: strength and sensation intact in all extremities. CN 2 - 12 intact.

## 2024-01-27 NOTE — ED PROVIDER NOTE - CLINICAL SUMMARY MEDICAL DECISION MAKING FREE TEXT BOX
73M with PMH ?aortic stenosis and prostate cancer who presents to ED for acute-on-subacute worsening chest pain. The pain is focal and non-radiating, "burning" in character. The pain does not improve/worsen with palpation or positional change. Pt is planned to meet with new cardiologist on 1/30 for eval for possible aortic valve replacement surgery. Denies fevers, chills, lightheadedness, dizziness, headache, vision changes, diaphoresis, dyspnea, nausea, emesis, abdominal pain, numbness/paresthesia. VSS in ED, physical exam unremarkable. Ddx include ACS, less likely GERD exacerbation, MSK pain, aortic dissection, or esophageal perf. Giving aspirin for poss ACS, ordered labs, ECG, CXR.

## 2024-01-27 NOTE — H&P ADULT - PROBLEM SELECTOR PLAN 2
Significant murmur on exam. Appears euvolemic. Plan was for TTE on 1/30 then determination if surgery was necessary. Plan was to follow with Dr. Saha.   - TTE as above

## 2024-01-27 NOTE — ED PROVIDER NOTE - OBJECTIVE STATEMENT
73M with PMH ?aortic stenosis and prostate cancer who presents to ED for acute-on-subacute worsening chest pain.    Pt reports having had L chest wall pain for 3 weeks, used to be intermittent, now becoming more frequent and severe. States he woke up from sleep with the chest pain, which is what prompted him to come to ED. The pain is focal and non-radiating, "burning" in character, now can last for hours. The pain does not improve/worsen with palpation or positional change. States he took ASA81 which usually improves the pain but not this time. Pt is planned to meet with new cardiologist on 1/30 for eval for possible aortic valve replacement surgery. Endorses general weakness, dyspnea on exertion that is stable. Denies fevers, chills, lightheadedness, dizziness, headache, vision changes, diaphoresis, dyspnea, nausea, emesis, abdominal pain, numbness/paresthesia.

## 2024-01-27 NOTE — H&P ADULT - NSHPPHYSICALEXAM_GEN_ALL_CORE
VITAL SIGNS:  T(C): 36.5 (01-27-24 @ 11:30), Max: 36.8 (01-27-24 @ 01:00)  T(F): 97.7 (01-27-24 @ 11:30), Max: 98.2 (01-27-24 @ 01:00)  HR: 60 (01-27-24 @ 11:30) (60 - 78)  BP: 138/76 (01-27-24 @ 11:30) (133/74 - 152/77)  BP(mean): 93 (01-27-24 @ 07:36) (90 - 98)  RR: 16 (01-27-24 @ 11:30) (16 - 20)  SpO2: 99% (01-27-24 @ 11:30) (99% - 100%)    PHYSICAL EXAM:  Constitutional: WDWN resting comfortably in bed; NAD  Head: NC/AT  Eyes: PERRL, EOMI, anicteric sclera  ENT: no nasal discharge; uvula midline, no oropharyngeal erythema or exudates; MMM  Neck: supple; no JVD or thyromegaly  Respiratory: CTA B/L; no W/R/R, no retractions  Cardiac: RRR, 3/6 systolic murmur   Gastrointestinal: abdomen soft, NT/ND; no rebound or guarding; +BSx4  Back: spine midline, no bony tenderness or step-offs; no CVAT B/L  Extremities: WWP, no clubbing or cyanosis; no peripheral edema  Musculoskeletal: NROM x4; no joint swelling, tenderness or erythema  Vascular: 2+ radial, femoral, DP/PT pulses B/L  Dermatologic: skin warm, dry and intact; no rashes, wounds, or scars  Lymphatic: no submandibular or cervical LAD  Neurologic: AAOx3; CNII-XII grossly intact; no focal deficits  Psychiatric: affect and characteristics of appearance, verbalizations, behaviors are appropriate

## 2024-01-27 NOTE — H&P ADULT - PROBLEM SELECTOR PLAN 1
Patient presents with burning chest pain. EKG without clear ischemic changes. Troponin not elevated 2x suggesting ACS to be less likely.   - TTE ordered to evaluate for WMA  - Continue to monitor on telemetry

## 2024-01-27 NOTE — H&P ADULT - NSICDXPASTMEDICALHX_GEN_ALL_CORE_FT
PAST MEDICAL HISTORY:  Aortic stenosis     GERD (gastroesophageal reflux disease)     Prostate CA

## 2024-01-27 NOTE — ED ADULT NURSE REASSESSMENT NOTE - NS ED NURSE REASSESS COMMENT FT1
Report received on patient from DONALD Bird RN. Patient seen ambulating around department. Denies CP but complaints of abdominal pains/indigestion. As per prior RN she made hospitalist aware. Patient was updated on plan of care. Pending admission bed placement.

## 2024-01-28 LAB
A1C WITH ESTIMATED AVERAGE GLUCOSE RESULT: 5.3 % — SIGNIFICANT CHANGE UP (ref 4–5.6)
ANION GAP SERPL CALC-SCNC: 11 MMOL/L — SIGNIFICANT CHANGE UP (ref 5–17)
BUN SERPL-MCNC: 20 MG/DL — SIGNIFICANT CHANGE UP (ref 7–23)
CALCIUM SERPL-MCNC: 9.6 MG/DL — SIGNIFICANT CHANGE UP (ref 8.4–10.5)
CHLORIDE SERPL-SCNC: 105 MMOL/L — SIGNIFICANT CHANGE UP (ref 96–108)
CO2 SERPL-SCNC: 27 MMOL/L — SIGNIFICANT CHANGE UP (ref 22–31)
CREAT SERPL-MCNC: 1.36 MG/DL — HIGH (ref 0.5–1.3)
EGFR: 54 ML/MIN/1.73M2 — LOW
ESTIMATED AVERAGE GLUCOSE: 105 MG/DL — SIGNIFICANT CHANGE UP (ref 68–114)
GLUCOSE SERPL-MCNC: 99 MG/DL — SIGNIFICANT CHANGE UP (ref 70–99)
HCT VFR BLD CALC: 38.7 % — LOW (ref 39–50)
HCV AB S/CO SERPL IA: 0.08 S/CO — SIGNIFICANT CHANGE UP (ref 0–0.99)
HCV AB SERPL-IMP: SIGNIFICANT CHANGE UP
HGB BLD-MCNC: 13 G/DL — SIGNIFICANT CHANGE UP (ref 13–17)
MCHC RBC-ENTMCNC: 29.7 PG — SIGNIFICANT CHANGE UP (ref 27–34)
MCHC RBC-ENTMCNC: 33.6 GM/DL — SIGNIFICANT CHANGE UP (ref 32–36)
MCV RBC AUTO: 88.4 FL — SIGNIFICANT CHANGE UP (ref 80–100)
NRBC # BLD: 0 /100 WBCS — SIGNIFICANT CHANGE UP (ref 0–0)
PLATELET # BLD AUTO: 158 K/UL — SIGNIFICANT CHANGE UP (ref 150–400)
POTASSIUM SERPL-MCNC: 4 MMOL/L — SIGNIFICANT CHANGE UP (ref 3.5–5.3)
POTASSIUM SERPL-SCNC: 4 MMOL/L — SIGNIFICANT CHANGE UP (ref 3.5–5.3)
RBC # BLD: 4.38 M/UL — SIGNIFICANT CHANGE UP (ref 4.2–5.8)
RBC # FLD: 11.9 % — SIGNIFICANT CHANGE UP (ref 10.3–14.5)
SODIUM SERPL-SCNC: 143 MMOL/L — SIGNIFICANT CHANGE UP (ref 135–145)
WBC # BLD: 9.03 K/UL — SIGNIFICANT CHANGE UP (ref 3.8–10.5)
WBC # FLD AUTO: 9.03 K/UL — SIGNIFICANT CHANGE UP (ref 3.8–10.5)

## 2024-01-28 PROCEDURE — 93306 TTE W/DOPPLER COMPLETE: CPT | Mod: 26

## 2024-01-28 PROCEDURE — 76376 3D RENDER W/INTRP POSTPROCES: CPT | Mod: 26

## 2024-01-28 RX ADMIN — PANTOPRAZOLE SODIUM 40 MILLIGRAM(S): 20 TABLET, DELAYED RELEASE ORAL at 05:49

## 2024-01-28 RX ADMIN — TAMSULOSIN HYDROCHLORIDE 0.4 MILLIGRAM(S): 0.4 CAPSULE ORAL at 21:10

## 2024-01-28 RX ADMIN — HEPARIN SODIUM 5000 UNIT(S): 5000 INJECTION INTRAVENOUS; SUBCUTANEOUS at 05:27

## 2024-01-28 RX ADMIN — HEPARIN SODIUM 5000 UNIT(S): 5000 INJECTION INTRAVENOUS; SUBCUTANEOUS at 14:28

## 2024-01-28 RX ADMIN — LIDOCAINE 1 PATCH: 4 CREAM TOPICAL at 22:45

## 2024-01-28 RX ADMIN — HEPARIN SODIUM 5000 UNIT(S): 5000 INJECTION INTRAVENOUS; SUBCUTANEOUS at 21:17

## 2024-01-28 NOTE — PROGRESS NOTE ADULT - ASSESSMENT
73M PMH AS, GERD, prostate cancer, presents with chest pain, now admitted for evaluation     Plan:    # Chest pain:  - Trops wnl  - CXR WNL  - Echo pending  - Pain control  - Monitor on tele  - Cards eval pending    # AS:  - Echo pending  - Monitor on tele    # DARIUS:  - Monitor I/O's  - Serial Cr  - Avoid nephrotoxins  - Renally dose medications  - Continue to monitor    # Prostate ca:  - C/w current meds    # GERD:  - C/w current meds/ PPI    # DVT ppx:  - IPC's  - Heparin     [facility]  [phone]

## 2024-01-29 PROBLEM — I35.0 AORTIC STENOSIS, SEVERE: Status: ACTIVE | Noted: 2024-01-29

## 2024-01-29 PROBLEM — I35.0 NONRHEUMATIC AORTIC (VALVE) STENOSIS: Chronic | Status: ACTIVE | Noted: 2024-01-27

## 2024-01-29 PROBLEM — C61 MALIGNANT NEOPLASM OF PROSTATE: Chronic | Status: ACTIVE | Noted: 2024-01-27

## 2024-01-29 PROBLEM — K21.9 GASTRO-ESOPHAGEAL REFLUX DISEASE WITHOUT ESOPHAGITIS: Chronic | Status: ACTIVE | Noted: 2024-01-27

## 2024-01-29 LAB
ANION GAP SERPL CALC-SCNC: 13 MMOL/L — SIGNIFICANT CHANGE UP (ref 5–17)
APTT BLD: 32.3 SEC — SIGNIFICANT CHANGE UP (ref 24.5–35.6)
BUN SERPL-MCNC: 20 MG/DL — SIGNIFICANT CHANGE UP (ref 7–23)
CALCIUM SERPL-MCNC: 10.1 MG/DL — SIGNIFICANT CHANGE UP (ref 8.4–10.5)
CHLORIDE SERPL-SCNC: 103 MMOL/L — SIGNIFICANT CHANGE UP (ref 96–108)
CO2 SERPL-SCNC: 27 MMOL/L — SIGNIFICANT CHANGE UP (ref 22–31)
CREAT SERPL-MCNC: 1.31 MG/DL — HIGH (ref 0.5–1.3)
EGFR: 57 ML/MIN/1.73M2 — LOW
GLUCOSE SERPL-MCNC: 107 MG/DL — HIGH (ref 70–99)
HCT VFR BLD CALC: 39.9 % — SIGNIFICANT CHANGE UP (ref 39–50)
HGB BLD-MCNC: 13.5 G/DL — SIGNIFICANT CHANGE UP (ref 13–17)
MCHC RBC-ENTMCNC: 29 PG — SIGNIFICANT CHANGE UP (ref 27–34)
MCHC RBC-ENTMCNC: 33.8 GM/DL — SIGNIFICANT CHANGE UP (ref 32–36)
MCV RBC AUTO: 85.8 FL — SIGNIFICANT CHANGE UP (ref 80–100)
NRBC # BLD: 0 /100 WBCS — SIGNIFICANT CHANGE UP (ref 0–0)
PLATELET # BLD AUTO: 160 K/UL — SIGNIFICANT CHANGE UP (ref 150–400)
POTASSIUM SERPL-MCNC: 4 MMOL/L — SIGNIFICANT CHANGE UP (ref 3.5–5.3)
POTASSIUM SERPL-SCNC: 4 MMOL/L — SIGNIFICANT CHANGE UP (ref 3.5–5.3)
RBC # BLD: 4.65 M/UL — SIGNIFICANT CHANGE UP (ref 4.2–5.8)
RBC # FLD: 11.9 % — SIGNIFICANT CHANGE UP (ref 10.3–14.5)
SODIUM SERPL-SCNC: 143 MMOL/L — SIGNIFICANT CHANGE UP (ref 135–145)
WBC # BLD: 8.34 K/UL — SIGNIFICANT CHANGE UP (ref 3.8–10.5)
WBC # FLD AUTO: 8.34 K/UL — SIGNIFICANT CHANGE UP (ref 3.8–10.5)

## 2024-01-29 RX ORDER — CLOPIDOGREL BISULFATE 75 MG/1
75 TABLET, FILM COATED ORAL DAILY
Refills: 0 | Status: DISCONTINUED | OUTPATIENT
Start: 2024-01-30 | End: 2024-01-31

## 2024-01-29 RX ORDER — SILODOSIN 4 MG/1
4 CAPSULE ORAL AT BEDTIME
Refills: 0 | Status: DISCONTINUED | OUTPATIENT
Start: 2024-01-29 | End: 2024-01-31

## 2024-01-29 RX ORDER — CHLORHEXIDINE GLUCONATE 213 G/1000ML
1 SOLUTION TOPICAL
Refills: 0 | Status: DISCONTINUED | OUTPATIENT
Start: 2024-01-29 | End: 2024-01-31

## 2024-01-29 RX ORDER — ASPIRIN/CALCIUM CARB/MAGNESIUM 324 MG
81 TABLET ORAL DAILY
Refills: 0 | Status: DISCONTINUED | OUTPATIENT
Start: 2024-01-30 | End: 2024-01-31

## 2024-01-29 RX ORDER — HEPARIN SODIUM 5000 [USP'U]/ML
5000 INJECTION INTRAVENOUS; SUBCUTANEOUS EVERY 6 HOURS
Refills: 0 | Status: DISCONTINUED | OUTPATIENT
Start: 2024-01-29 | End: 2024-01-30

## 2024-01-29 RX ORDER — HEPARIN SODIUM 5000 [USP'U]/ML
INJECTION INTRAVENOUS; SUBCUTANEOUS
Qty: 25000 | Refills: 0 | Status: DISCONTINUED | OUTPATIENT
Start: 2024-01-29 | End: 2024-01-30

## 2024-01-29 RX ADMIN — LIDOCAINE 1 PATCH: 4 CREAM TOPICAL at 09:58

## 2024-01-29 RX ADMIN — HEPARIN SODIUM 1000 UNIT(S)/HR: 5000 INJECTION INTRAVENOUS; SUBCUTANEOUS at 19:04

## 2024-01-29 RX ADMIN — PANTOPRAZOLE SODIUM 40 MILLIGRAM(S): 20 TABLET, DELAYED RELEASE ORAL at 05:08

## 2024-01-29 RX ADMIN — HEPARIN SODIUM 5000 UNIT(S): 5000 INJECTION INTRAVENOUS; SUBCUTANEOUS at 05:08

## 2024-01-29 RX ADMIN — CHLORHEXIDINE GLUCONATE 1 APPLICATION(S): 213 SOLUTION TOPICAL at 11:56

## 2024-01-29 RX ADMIN — SILODOSIN 4 MILLIGRAM(S): 4 CAPSULE ORAL at 22:21

## 2024-01-29 NOTE — CONSULT NOTE ADULT - TIME BILLING
at cath, Agree with above ACP note.  A/p  73M PMH aortic stenosis, GERD and prostate cancer presents to the hospital with chest pain.    #Chest Pain  -for cath today   #Aortic Stenosis  -Moderate on echo  -structural eval   dvt ppx

## 2024-01-29 NOTE — CONSULT NOTE ADULT - ASSESSMENT
A/p  73M PMH aortic stenosis, GERD and prostate cancer presents to the hospital with chest pain.    #Chest Pain  -Atypical chest pain, no concern for acs  -EKG SR with bifascicular block  -HST negative x2  -CXR clear  -No active CP on my exam  -Echo with nml lv fxn, moderate AS   -Has not had ischemic eval > 15 years  -Recommend nuclear stress test    #Aortic Stenosis  -Moderate on echo  -No overload on exam      dvt ppx   A/p  73M PMH aortic stenosis, GERD and prostate cancer presents to the hospital with chest pain.    #Chest Pain  -Atypical chest pain, no concern for acs  -EKG SR with bifascicular block  -HST negative x2  -CXR clear  -No active CP on my exam  -Echo with nml lv fxn, moderate AS   -Has not had ischemic eval > 15 years  -Plan for cardiac cath today    #Aortic Stenosis  -Moderate on echo  -No overload on exam      dvt ppx

## 2024-01-29 NOTE — CONSULT NOTE ADULT - SUBJECTIVE AND OBJECTIVE BOX
CARDIOLOGY CONSULT - Dr. Castellano         HPI:  73M PMH aortic stenosis, GERD and prostate cancer presents to the hospital with chest pain. Patient reports that he has chronic chest pain, however this has been worsening prior to admission. Patient describes pain as burning, and likens discomfort to heartburn, but reports sensation is higher up. Denies chest pressure. Denies radiation. Denies palpitations. Denies syncope or near syncope. Denies radiation of pain. Patient reports that pain is not changed with positional changes.   ROS otherwise noncontributory.   Of note, the patient has a scheduled appointment with cardiology on 1/30 for final planning for possible aortic valve surgery.     In ED, VSS. CBC with mild normocytic anemia. CMP with mild elevation in Cr but otherwise noncontributory. Troponin not elevated 2x. BNP not elevated. EKG with NSR. CXR clear. Patient now for admission for further evaluation and treatment of chest pain.  (27 Jan 2024 15:25)      PAST MEDICAL & SURGICAL HISTORY:  Aortic stenosis      GERD (gastroesophageal reflux disease)      Prostate CA      PREVIOUS DIAGNOSTIC TESTING:    [x] Echocardiogram:  < from: TTE Limited W or WO Ultrasound Enhancing Agent (01.28.24 @ 08:12) >     CONCLUSIONS:      1. Left ventricular cavity is normal. Left ventricular wall thickness is normal. Left ventricular systolic function is normal with an ejection fraction of 55 % by 3D. There are no regional wall motion abnormalities seen.   2. Normal right ventricular cavity size and normal systolic function.   3. Structurally normal mitral valve with normal leaflet excursion. There is calcification of the mitral valve annulus. There is trace mitral regurgitation.   4. The aortic valve anatomy cannot be determined with reduced systolic excursion. There is calcification of the aortic valve leaflets. There is moderate aortic stenosis. The peak transaortic velocity is 3.45 m/s, peak transaortic gradient is 47.6 mmHg and mean transaortic gradient is 26.0 mmHg with an LVOT/aortic valve VTI ratio of 0.35. The aortic valve area is estimated at 1.21 cm² by the continuity equation. There is mild to moderate aortic regurgitation.   5. No prior echocardiogram is available for comparison.    < end of copied text >    [ ]  Catheterization:  [ ] Stress Test:  	    MEDICATIONS:  Home Medications:  Duragesic-100 transdermal film, extended release: 1 patch transdermally every other day (27 Jan 2024 15:32)  esomeprazole 20 mg oral delayed release tablet: 1 tab(s) orally once a day (27 Jan 2024 15:33)  Lidoderm 5% topical film: Apply topically to affected area (27 Jan 2024 15:33)  silodosin 4 mg oral capsule: 1 cap(s) orally once a day (at bedtime) (27 Jan 2024 15:31)      MEDICATIONS  (STANDING):  fentaNYL   Patch 100 MICROgram(s)/Hr. 1 Patch Transdermal every 48 hours  heparin   Injectable 5000 Unit(s) SubCutaneous every 8 hours  pantoprazole    Tablet 40 milliGRAM(s) Oral before breakfast  tamsulosin 0.4 milliGRAM(s) Oral at bedtime      FAMILY HISTORY:      SOCIAL HISTORY:    [x] Non-smoker - former  [ ] Smoker  [ ] Alcohol    Allergies    No Known Allergies    Intolerances    	    REVIEW OF SYSTEMS:  CONSTITUTIONAL: No fever, weight loss, or fatigue  EYES: No eye pain, visual disturbances, or discharge  ENMT:  No difficulty hearing, tinnitus, vertigo; No sinus or throat pain  NECK: No pain or stiffness  RESPIRATORY: No cough, wheezing, chills or hemoptysis; No Shortness of Breath  CARDIOVASCULAR: +Intermittent chest pain, no palpitations, passing out, dizziness, or leg swelling  GASTROINTESTINAL: No abdominal or epigastric pain. No nausea, vomiting, or hematemesis; No diarrhea or constipation. No melena or hematochezia.  GENITOURINARY: No dysuria, frequency, hematuria, or incontinence  NEUROLOGICAL: No headaches, memory loss, loss of strength, numbness, or tremors  SKIN: No itching, burning, rashes, or lesions   	    [x] All others negative	  [ ] Unable to obtain    PHYSICAL EXAM:  T(C): 37.2 (01-29-24 @ 05:10), Max: 37.2 (01-29-24 @ 05:10)  HR: 60 (01-29-24 @ 05:10) (54 - 68)  BP: 130/72 (01-29-24 @ 05:10) (122/66 - 146/62)  RR: 20 (01-29-24 @ 05:10) (16 - 20)  SpO2: 98% (01-29-24 @ 05:10) (98% - 100%)  Wt(kg): --  I&O's Summary    28 Jan 2024 07:01  -  29 Jan 2024 07:00  --------------------------------------------------------  IN: 600 mL / OUT: 0 mL / NET: 600 mL        Appearance: Normal	  Psychiatry: A & O x 3, Mood & affect appropriate  HEENT:   Normal oral mucosa, PERRL, EOMI	  Lymphatic: No lymphadenopathy  Cardiovascular: Normal S1 S2,RRR, No JVD, +Systolic murmur  Respiratory: Lungs clear to auscultation b/l  Gastrointestinal:  Soft, Non-tender, + BS	  Skin: No rashes, No ecchymoses, No cyanosis	  Neurologic: Non-focal  Extremities: Normal range of motion, No clubbing, cyanosis or edema  Vascular: Peripheral pulses palpable 2+ bilaterally    TELEMETRY: NSR 58-70	    ECG: EKG 63bpm bifascicular block 	  RADIOLOGY:  < from: Xray Chest 1 View- PORTABLE-Urgent (Xray Chest 1 View- PORTABLE-Urgent .) (01.27.24 @ 04:05) >    FINDINGS:  Cervical spinal fusion hardware.  The heart is not accurately assessed in this AP projection.  No focal consolidation.  There is no pneumothorax or pleural effusion.    IMPRESSION:  No focal consolidation.    --- End of Report ---    < end of copied text >    OTHER: 	  	  LABS:	 	    CARDIAC MARKERS:  Troponin T, High Sensitivity Result: 26 ng/L (01-27 @ 05:18)  Troponin T, High Sensitivity Result: 29 ng/L (01-27 @ 03:47)                                  13.0   9.03  )-----------( 158      ( 28 Jan 2024 07:01 )             38.7     01-28    143  |  105  |  20  ----------------------------<  99  4.0   |  27  |  1.36<H>    Ca    9.6      28 Jan 2024 07:01        proBNP:   Lipid Profile:   HgA1c:   TSH:       
HPI:  73M PMH aortic stenosis, GERD and prostate cancer presents to the hospital with chest pain. Patient reports that he has chronic chest pain, however this has been worsening prior to admission. Patient describes pain as burning, and likens discomfort to heartburn, but reports sensation is higher up. Denies chest pressure. Denies radiation. Denies palpitations. Denies syncope or near syncope. Denies radiation of pain. Patient reports that pain is not changed with positional changes.   ROS otherwise noncontributory.     Called to see Mr Dykes for TAVR evaluation. Of note, he was scheduled to come to [facility] tomorrow for outpatient evaluation. He describes that he has had a relatively limited lifestyle due to his neck and back pain ("it hurts to lay flat, sit up, and stand up). He denies dyspnea on exertion and leg swelling as well as syncope. He states that he had experienced intermittent chest pain in the past but this past weekend he was awakened by the pain and it was more severe than previous. He called 911 and was able to walk to the ambulance but said that the pain had not improved significantly and continued to wax and wane. He has been told that he had a murmur for many years and was told it has gotten more pronounced recently.        Allergies    No Known Allergies    Intolerances      PAST MEDICAL & SURGICAL HISTORY:  Aortic stenosis    GERD (gastroesophageal reflux disease)    Prostate CA      MEDICATIONS  (STANDING):  chlorhexidine 2% Cloths 1 Application(s) Topical <User Schedule>  fentaNYL   Patch 100 MICROgram(s)/Hr. 1 Patch Transdermal every 48 hours  pantoprazole    Tablet 40 milliGRAM(s) Oral before breakfast  tamsulosin 0.4 milliGRAM(s) Oral at bedtime      REVIEW OF SYSTEMS:    CONSTITUTIONAL: No weakness, fevers or chills  EYES/ENT: No visual changes;  No vertigo or throat pain   NECK: No pain or stiffness  RESPIRATORY: No cough, wheezing, hemoptysis; No shortness of breath  CARDIOVASCULAR: No chest pain or palpitations  GASTROINTESTINAL: No abdominal or epigastric pain. No nausea, vomiting, or hematemesis; No diarrhea or constipation. No melena or hematochezia.  GENITOURINARY: No dysuria, frequency or hematuria  NEUROLOGICAL: No numbness or weakness  SKIN: No itching, rashes      Vital Signs Last 24 Hrs  T(C): 36.6 (29 Jan 2024 14:30), Max: 37.2 (29 Jan 2024 05:10)  T(F): 97.8 (29 Jan 2024 14:30), Max: 98.9 (29 Jan 2024 05:10)  HR: 70 (29 Jan 2024 15:30) (54 - 70)  BP: 149/66 (29 Jan 2024 15:30) (122/66 - 176/77)  BP(mean): 96 (29 Jan 2024 15:30) (96 - 115)  RR: 18 (29 Jan 2024 15:00) (16 - 20)  SpO2: 95% (29 Jan 2024 15:30) (94% - 100%)    Parameters below as of 29 Jan 2024 15:00  Patient On (Oxygen Delivery Method): room air                              13.5   8.34  )-----------( 160      ( 29 Jan 2024 12:28 )             39.9   01-29    143  |  103  |  20  ----------------------------<  107<H>  4.0   |  27  |  1.31<H>    Ca    10.1      29 Jan 2024 12:28          I&O's Summary    28 Jan 2024 07:01  -  29 Jan 2024 07:00  --------------------------------------------------------  IN: 600 mL / OUT: 0 mL / NET: 600 mL          Physical Exam  General: NAD, WDWN  Cardiac: s1s2, RRR, II/VI systolic murmur  Pulmonary: CTA anteriorly b/l, no w/r/r  Gastrointestinal: soft abdomen, nontender, nondistended, + bowel sounds throughout  Extremities: no edema, 2+ DP pulses b/l   Neuro: A&Ox3, nonfocal  EKG: bifascicular block      < from: TTE Limited W or WO Ultrasound Enhancing Agent (01.28.24 @ 08:12) >  CONCLUSIONS:      1. Left ventricular cavity is normal. Left ventricular wall thickness is normal. Left ventricular systolic function is normal with an ejection fraction of 55 % by 3D. There are no regional wall motion abnormalities seen.   2. Normal right ventricular cavity size and normal systolic function.   3. Structurally normal mitral valve with normal leaflet excursion. There is calcification of the mitral valve annulus. There is trace mitral regurgitation.   4. The aortic valve anatomy cannot be determined with reduced systolic excursion. There is calcification of the aortic valve leaflets. There is moderate aortic stenosis. The peak transaortic velocity is 3.45 m/s, peak transaortic gradient is 47.6 mmHg and mean transaortic gradient is 26.0 mmHg with an LVOT/aortic valve VTI ratio of 0.35. The aortic valve area is estimated at 1.21 cm² by the continuity equation. There is mild to moderate aortic regurgitation.   5. No prior echocardiogram is available for comparison.    ________________________________________________________________________________________  FINDINGS:     Left Ventricle:  The left ventricular cavity is normal. Left ventricular wall thickness is normal. Left ventricular systolic function is normal with a calculated ejection fraction of 55 % by 3D. There are no regional wall motion abnormalities seen.     Right Ventricle:  The right ventricular cavity is normal in size and normal systolic function. Tricuspid annular plane systolic excursion (TAPSE) is 2.2 cm (normal >=1.7 cm).     Left Atrium:  The left atrium is normal with an indexed volume of 22.42 ml/m².     Right Atrium:  The right atrium is normal in size with an indexed volume of 22.86 ml/m².     Aortic Valve:  The aortic valve anatomy cannot be determined with reduced systolic excursion. There is calcification of the aortic valve leaflets. There is moderate aortic stenosis. The peak transaortic velocity is 3.45 m/s, peak transaortic gradient is 47.6 mmHg and mean transaortic gradient is 26.0 mmHg with an LVOT/aortic valve VTI ratio of 0.35. The aortic valve area is estimated at 1.21 cm² by the continuity equation. There is mild to moderate aortic regurgitation.     Mitral Valve:  Structurally normal mitral valve with normal leaflet excursion. There is calcification of the mitral valve annulus. There is trace mitral regurgitation.     Tricuspid Valve:  Structurally normal tricuspid valve with normal leaflet excursion. There is mild tricuspid regurgitation.     Pulmonic Valve:  Structurally normal pulmonic valve with normal leaflet excursion. There is mild pulmonic regurgitation.     Aorta:  The aortic root at the sinuses of Valsalva is normal in size, measuring 3.30 cm (indexed 1.60 cm/m²). The ascending aorta diameter is normal in size, measuring 3.70 cm (indexed 1.80 cm/m²).     Pericardium:  No pericardial effusion seen.     Systemic Veins:  The inferior vena cava was not well visualized.  ____________________________________________________________________  QUANTITATIVE DATA:  Left Ventricle Measurements: (Indexed to BSA)     IVSd (2D):   0.8 cm  LVPWd (2D):  0.7 cm  LVIDd (2D):  5.4 cm  LVIDs (2D):  3.0 cm  LV Mass:     143 g  69.5 g/m²  3D LV EF%: 55 %     MV E Vmax:    0.61 m/s  MV A Vmax:    0.72 m/s  MV E/A:       0.85  e' lateral:   11.20 cm/s  e' medial:    8.59 cm/s  E/e' lateral: 5.47  E/e' medial:  7.14  E/e' Average: 6.20    Aorta Measurements: (normal range) (Indexed to BSA)     Sinuses of Valsalva: 3.30 cm (3.1 - 3.7 cm)  Ao Asc prox:         3.70 cm       Left Atrium Measurements: (Indexed to BSA)  LA Diam 2D: 3.50 cm    Right Ventricle Measurements:Right Atrial Measurements:     TAPSE:           2.2 cm       RA Vol:       47.00 ml  RV Base (RVID1): 4.6 cm       RA Vol Index: 22.86 ml/m²  RV Mid (RVID2):  4.1 cm       LVOT / RVOT/ Qp/Qs Data: (Indexed to BSA)  LVOT Diameter: 2.10 cm  LVOT Vmax:   1.15 m/s  LVOT VTI:      25.10 cm  LVOT SV:       86.9 ml  42.28 ml/m²    Aortic Valve Measurements:  AV Vmax:                3.4 m/s  AV Peak Gradient:       47.6 mmHg  AV Mean Gradient:       26.0 mmHg  AV VTI:                 71.8 cm  AV VTI Ratio:           0.35  AoV EOA, Contin:        1.21 cm²  AoV EOA, Contin i:      0.59 cm²/m²  AoV Dimensionless Index 0.35    Mitral Valve Measurements:     MV E Vmax: 0.6 m/s  MV A Vmax: 0.7 m/s  MV E/A:    0.9      < end of copied text >

## 2024-01-29 NOTE — CONSULT NOTE ADULT - ASSESSMENT
Mr Dykes is a 72y/o male with a PMHx of prostate CA, GERD, known AS, neck and back pain admitted with chest pain and found to have CAD.     NYHA II  STS risk for AVR/CABG 1.64%

## 2024-01-29 NOTE — CONSULT NOTE ADULT - PROBLEM SELECTOR RECOMMENDATION 9
- echocardiogram reviewed by our team and AS appears moderate-moderate/severe  - LHC done today showed significant RCA lesion which will be stented by Dr. Muniz tomorrow  - there is no indication for TAVR at this time  - we recommend PCI tomorrow and then we will arrange for follow up with Dr. Dickerson in [facility] in about 3 months with a repeat TTE to reassess his AS. If it has progressed to severe we can obtain the cardiac CTA which will complete our diagnostic testing

## 2024-01-30 ENCOUNTER — APPOINTMENT (OUTPATIENT)
Dept: CARDIOTHORACIC SURGERY | Facility: CLINIC | Age: 74
End: 2024-01-30

## 2024-01-30 DIAGNOSIS — G47.33 OBSTRUCTIVE SLEEP APNEA (ADULT) (PEDIATRIC): ICD-10-CM

## 2024-01-30 DIAGNOSIS — I35.0 NONRHEUMATIC AORTIC (VALVE) STENOSIS: ICD-10-CM

## 2024-01-30 LAB
ANION GAP SERPL CALC-SCNC: 11 MMOL/L — SIGNIFICANT CHANGE UP (ref 5–17)
APTT BLD: 60.3 SEC — HIGH (ref 24.5–35.6)
APTT BLD: 74.5 SEC — HIGH (ref 24.5–35.6)
BUN SERPL-MCNC: 18 MG/DL — SIGNIFICANT CHANGE UP (ref 7–23)
CALCIUM SERPL-MCNC: 9.6 MG/DL — SIGNIFICANT CHANGE UP (ref 8.4–10.5)
CHLORIDE SERPL-SCNC: 103 MMOL/L — SIGNIFICANT CHANGE UP (ref 96–108)
CO2 SERPL-SCNC: 27 MMOL/L — SIGNIFICANT CHANGE UP (ref 22–31)
CREAT SERPL-MCNC: 1.31 MG/DL — HIGH (ref 0.5–1.3)
EGFR: 57 ML/MIN/1.73M2 — LOW
GLUCOSE SERPL-MCNC: 113 MG/DL — HIGH (ref 70–99)
HCT VFR BLD CALC: 34.4 % — LOW (ref 39–50)
HCT VFR BLD CALC: 38.4 % — LOW (ref 39–50)
HGB BLD-MCNC: 11.9 G/DL — LOW (ref 13–17)
HGB BLD-MCNC: 12.8 G/DL — LOW (ref 13–17)
MCHC RBC-ENTMCNC: 28.8 PG — SIGNIFICANT CHANGE UP (ref 27–34)
MCHC RBC-ENTMCNC: 29.2 PG — SIGNIFICANT CHANGE UP (ref 27–34)
MCHC RBC-ENTMCNC: 33.3 GM/DL — SIGNIFICANT CHANGE UP (ref 32–36)
MCHC RBC-ENTMCNC: 34.6 GM/DL — SIGNIFICANT CHANGE UP (ref 32–36)
MCV RBC AUTO: 84.5 FL — SIGNIFICANT CHANGE UP (ref 80–100)
MCV RBC AUTO: 86.3 FL — SIGNIFICANT CHANGE UP (ref 80–100)
NRBC # BLD: 0 /100 WBCS — SIGNIFICANT CHANGE UP (ref 0–0)
NRBC # BLD: 0 /100 WBCS — SIGNIFICANT CHANGE UP (ref 0–0)
PLATELET # BLD AUTO: 156 K/UL — SIGNIFICANT CHANGE UP (ref 150–400)
PLATELET # BLD AUTO: 162 K/UL — SIGNIFICANT CHANGE UP (ref 150–400)
POTASSIUM SERPL-MCNC: 3.9 MMOL/L — SIGNIFICANT CHANGE UP (ref 3.5–5.3)
POTASSIUM SERPL-SCNC: 3.9 MMOL/L — SIGNIFICANT CHANGE UP (ref 3.5–5.3)
RBC # BLD: 4.07 M/UL — LOW (ref 4.2–5.8)
RBC # BLD: 4.45 M/UL — SIGNIFICANT CHANGE UP (ref 4.2–5.8)
RBC # FLD: 11.9 % — SIGNIFICANT CHANGE UP (ref 10.3–14.5)
RBC # FLD: 11.9 % — SIGNIFICANT CHANGE UP (ref 10.3–14.5)
SODIUM SERPL-SCNC: 141 MMOL/L — SIGNIFICANT CHANGE UP (ref 135–145)
WBC # BLD: 10.2 K/UL — SIGNIFICANT CHANGE UP (ref 3.8–10.5)
WBC # BLD: 10.25 K/UL — SIGNIFICANT CHANGE UP (ref 3.8–10.5)
WBC # FLD AUTO: 10.2 K/UL — SIGNIFICANT CHANGE UP (ref 3.8–10.5)
WBC # FLD AUTO: 10.25 K/UL — SIGNIFICANT CHANGE UP (ref 3.8–10.5)

## 2024-01-30 PROCEDURE — 93010 ELECTROCARDIOGRAM REPORT: CPT

## 2024-01-30 RX ORDER — SODIUM CHLORIDE 9 MG/ML
1000 INJECTION INTRAMUSCULAR; INTRAVENOUS; SUBCUTANEOUS
Refills: 0 | Status: DISCONTINUED | OUTPATIENT
Start: 2024-01-30 | End: 2024-01-31

## 2024-01-30 RX ORDER — ACETAMINOPHEN 500 MG
975 TABLET ORAL ONCE
Refills: 0 | Status: COMPLETED | OUTPATIENT
Start: 2024-01-30 | End: 2024-01-30

## 2024-01-30 RX ORDER — LIDOCAINE 4 G/100G
1 CREAM TOPICAL EVERY 24 HOURS
Refills: 0 | Status: DISCONTINUED | OUTPATIENT
Start: 2024-01-30 | End: 2024-01-31

## 2024-01-30 RX ADMIN — HEPARIN SODIUM 900 UNIT(S)/HR: 5000 INJECTION INTRAVENOUS; SUBCUTANEOUS at 08:32

## 2024-01-30 RX ADMIN — SODIUM CHLORIDE 75 MILLILITER(S): 9 INJECTION INTRAMUSCULAR; INTRAVENOUS; SUBCUTANEOUS at 13:13

## 2024-01-30 RX ADMIN — LIDOCAINE 1 PATCH: 4 CREAM TOPICAL at 19:30

## 2024-01-30 RX ADMIN — Medication 81 MILLIGRAM(S): at 11:03

## 2024-01-30 RX ADMIN — CLOPIDOGREL BISULFATE 75 MILLIGRAM(S): 75 TABLET, FILM COATED ORAL at 11:03

## 2024-01-30 RX ADMIN — LIDOCAINE 1 PATCH: 4 CREAM TOPICAL at 14:02

## 2024-01-30 RX ADMIN — HEPARIN SODIUM 900 UNIT(S)/HR: 5000 INJECTION INTRAVENOUS; SUBCUTANEOUS at 01:25

## 2024-01-30 RX ADMIN — Medication 975 MILLIGRAM(S): at 21:05

## 2024-01-30 RX ADMIN — CHLORHEXIDINE GLUCONATE 1 APPLICATION(S): 213 SOLUTION TOPICAL at 07:00

## 2024-01-30 RX ADMIN — Medication 975 MILLIGRAM(S): at 22:10

## 2024-01-30 RX ADMIN — PANTOPRAZOLE SODIUM 40 MILLIGRAM(S): 20 TABLET, DELAYED RELEASE ORAL at 06:15

## 2024-01-30 RX ADMIN — SILODOSIN 4 MILLIGRAM(S): 4 CAPSULE ORAL at 21:04

## 2024-01-30 NOTE — CHART NOTE - NSCHARTNOTEFT_GEN_A_CORE
Follow up appointment made with Dr. Dickerson on 5/7/2024. Repeat echocardiogram will be done at that time.  Appointment card given to patient and wife. Proceed with PCI today and management/discharge planning per primary team and cardiology.     NANCY Arguelles

## 2024-01-30 NOTE — PROGRESS NOTE ADULT - ASSESSMENT
73M PMH AS, GERD, prostate cancer, presents with chest pain, now admitted for evaluation     Plan:    # Chest pain:  - Trops wnl  - CXR WNL  - Echo w/ EF 55%, mild to moderate aortic regurgitation  - Pain control  - Monitor on tele  - s/p [facility] 1/29 with significant RCA lesion, plan for PCI today  - CTS consulted -> no indication for TAVR at this time    # AS:  - Echo w/ mild to moderate aortic regurgitation  - Monitor on tele  - CTS consulted -> no indication for TAVR at this time    # DARIUS:  - Monitor I/O's  - Serial Cr  - Avoid nephrotoxins  - Renally dose medications  - Continue to monitor    # Prostate ca:  - C/w current meds  - C/w Flomax    # GERD:  - C/w current meds/ PPI    # DVT ppx:  - IPC's  - Heparin gtt    [facility]

## 2024-01-30 NOTE — PROGRESS NOTE ADULT - TIME BILLING
agree with acp note above  -Echo with nml lv fxn, moderate AS   -Sp cardiac cath yest with severe mid-RCA disease  - pt unable to tolerate procedure so he is planned for staged PCI today

## 2024-01-30 NOTE — PROGRESS NOTE ADULT - ASSESSMENT
A/p  73M PMH aortic stenosis, GERD and prostate cancer presents to the hospital with chest pain.    #Chest Pain  -Atypical chest pain, no concern for acs  -EKG SR with bifascicular block  -HST negative x2  -CXR clear  -No active CP on my exam  -Echo with nml lv fxn, moderate AS   -Has not had ischemic eval > 15 years  -Sp cardiac cath yest with severe mid-RCA disease  - pt unable to tolerate procedure so he is planned for staged PCI today    #CAD  -Cath as above  -Plan for staged PCI to mid-RCA today  -Cont asa, statin, plavix    #Aortic Stenosis  -Moderate on echo  -Appreciate structural eval  -To f/u outpt       dvt ppx

## 2024-01-30 NOTE — CHART NOTE - NSCHARTNOTEFT_GEN_A_CORE
HPI:  73M PMH aortic stenosis, GERD and prostate cancer presents to the hospital with chest pain. Patient reports that he has chronic chest pain, however this has been worsening prior to admission. Patient describes pain as burning, and likens discomfort to heartburn, but reports sensation is higher up. Denies chest pressure. Denies radiation. Denies palpitations. Denies syncope or near syncope. Denies radiation of pain. Patient reports that pain is not changed with positional changes.     Pt is s/p cath via RRA     Vital Signs Last 24 Hrs  T(C): 36.7 (30 Jan 2024 21:42), Max: 36.7 (30 Jan 2024 21:42)  T(F): 98 (30 Jan 2024 21:42), Max: 98 (30 Jan 2024 21:42)  HR: 63 (30 Jan 2024 21:42) (58 - 77)  BP: 157/72 (30 Jan 2024 21:42) (117/60 - 157/72)  BP(mean): --  RR: 18 (30 Jan 2024 21:42) (16 - 18)  SpO2: 97% (30 Jan 2024 21:42) (95% - 100%)    Parameters below as of 30 Jan 2024 21:42  Patient On (Oxygen Delivery Method): room air    Emelin Reyes Monegro, NP   Medicine Department   [facility] [id number] HPI:  73M PMH aortic stenosis, GERD and prostate cancer presents to the hospital with chest pain. Patient reports that he has chronic chest pain, however this has been worsening prior to admission. Patient describes pain as burning, and likens discomfort to heartburn, but reports sensation is higher up. Denies chest pressure. Denies radiation. Denies palpitations. Denies syncope or near syncope. Denies radiation of pain. Patient reports that pain is not changed with positional changes.     Pt is s/p stage PCI mRCA.   R radial site dressing dry and intact, no ecchymosis or hematoma noted.  +2 pulse to palpation, pt denies numbness or tingling, full ROM present.      Vital Signs Last 24 Hrs  T(C): 36.7 (30 Jan 2024 21:42), Max: 36.7 (30 Jan 2024 21:42)  T(F): 98 (30 Jan 2024 21:42), Max: 98 (30 Jan 2024 21:42)  HR: 63 (30 Jan 2024 21:42) (58 - 77)  BP: 157/72 (30 Jan 2024 21:42) (117/60 - 157/72)  BP(mean): --  RR: 18 (30 Jan 2024 21:42) (16 - 18)  SpO2: 97% (30 Jan 2024 21:42) (95% - 100%)    Parameters below as of 30 Jan 2024 21:42  Patient On (Oxygen Delivery Method): room air    Emelin Reyes Monegro, NP   Medicine Department   [facility] [id number] HPI:  73M PMH aortic stenosis, GERD and prostate cancer presents to the hospital with chest pain. Patient reports that he has chronic chest pain, however this has been worsening prior to admission. Patient describes pain as burning, and likens discomfort to heartburn, but reports sensation is higher up. Denies chest pressure. Denies radiation. Denies palpitations. Denies syncope or near syncope. Denies radiation of pain. Patient reports that pain is not changed with positional changes.     Pt is s/p stage PCI mRCA.   R radial site dressing dry and intact, no ecchymosis or hematoma noted.  +2 pulse to palpation, pt denies pain, numbness or tingling, full ROM present.  Will continue to monitor site.    Vital Signs Last 24 Hrs  T(C): 36.7 (30 Jan 2024 21:42), Max: 36.7 (30 Jan 2024 21:42)  T(F): 98 (30 Jan 2024 21:42), Max: 98 (30 Jan 2024 21:42)  HR: 63 (30 Jan 2024 21:42) (58 - 77)  BP: 157/72 (30 Jan 2024 21:42) (117/60 - 157/72)  BP(mean): --  RR: 18 (30 Jan 2024 21:42) (16 - 18)  SpO2: 97% (30 Jan 2024 21:42) (95% - 100%)    Parameters below as of 30 Jan 2024 21:42  Patient On (Oxygen Delivery Method): room air    Emelin Reyes Monegro, NP   Medicine Department   [facility] [id number]

## 2024-01-31 ENCOUNTER — TRANSCRIPTION ENCOUNTER (OUTPATIENT)
Age: 74
End: 2024-01-31

## 2024-01-31 VITALS
SYSTOLIC BLOOD PRESSURE: 118 MMHG | OXYGEN SATURATION: 99 % | RESPIRATION RATE: 18 BRPM | HEART RATE: 67 BPM | TEMPERATURE: 98 F | DIASTOLIC BLOOD PRESSURE: 70 MMHG

## 2024-01-31 LAB
ANION GAP SERPL CALC-SCNC: 12 MMOL/L — SIGNIFICANT CHANGE UP (ref 5–17)
BUN SERPL-MCNC: 17 MG/DL — SIGNIFICANT CHANGE UP (ref 7–23)
CALCIUM SERPL-MCNC: 10 MG/DL — SIGNIFICANT CHANGE UP (ref 8.4–10.5)
CHLORIDE SERPL-SCNC: 103 MMOL/L — SIGNIFICANT CHANGE UP (ref 96–108)
CO2 SERPL-SCNC: 26 MMOL/L — SIGNIFICANT CHANGE UP (ref 22–31)
CREAT SERPL-MCNC: 1.31 MG/DL — HIGH (ref 0.5–1.3)
EGFR: 57 ML/MIN/1.73M2 — LOW
GLUCOSE SERPL-MCNC: 110 MG/DL — HIGH (ref 70–99)
HCT VFR BLD CALC: 40.5 % — SIGNIFICANT CHANGE UP (ref 39–50)
HGB BLD-MCNC: 13.6 G/DL — SIGNIFICANT CHANGE UP (ref 13–17)
MCHC RBC-ENTMCNC: 29.4 PG — SIGNIFICANT CHANGE UP (ref 27–34)
MCHC RBC-ENTMCNC: 33.6 GM/DL — SIGNIFICANT CHANGE UP (ref 32–36)
MCV RBC AUTO: 87.5 FL — SIGNIFICANT CHANGE UP (ref 80–100)
NRBC # BLD: 0 /100 WBCS — SIGNIFICANT CHANGE UP (ref 0–0)
PLATELET # BLD AUTO: 171 K/UL — SIGNIFICANT CHANGE UP (ref 150–400)
POTASSIUM SERPL-MCNC: 4.1 MMOL/L — SIGNIFICANT CHANGE UP (ref 3.5–5.3)
POTASSIUM SERPL-SCNC: 4.1 MMOL/L — SIGNIFICANT CHANGE UP (ref 3.5–5.3)
RBC # BLD: 4.63 M/UL — SIGNIFICANT CHANGE UP (ref 4.2–5.8)
RBC # FLD: 12.1 % — SIGNIFICANT CHANGE UP (ref 10.3–14.5)
SODIUM SERPL-SCNC: 141 MMOL/L — SIGNIFICANT CHANGE UP (ref 135–145)
WBC # BLD: 11.75 K/UL — HIGH (ref 3.8–10.5)
WBC # FLD AUTO: 11.75 K/UL — HIGH (ref 3.8–10.5)

## 2024-01-31 PROCEDURE — C1753: CPT

## 2024-01-31 PROCEDURE — C1874: CPT

## 2024-01-31 PROCEDURE — 93306 TTE W/DOPPLER COMPLETE: CPT

## 2024-01-31 PROCEDURE — 84484 ASSAY OF TROPONIN QUANT: CPT

## 2024-01-31 PROCEDURE — 99285 EMERGENCY DEPT VISIT HI MDM: CPT

## 2024-01-31 PROCEDURE — C9600: CPT | Mod: RC

## 2024-01-31 PROCEDURE — 83605 ASSAY OF LACTIC ACID: CPT

## 2024-01-31 PROCEDURE — 85379 FIBRIN DEGRADATION QUANT: CPT

## 2024-01-31 PROCEDURE — 85027 COMPLETE CBC AUTOMATED: CPT

## 2024-01-31 PROCEDURE — 84132 ASSAY OF SERUM POTASSIUM: CPT

## 2024-01-31 PROCEDURE — 82947 ASSAY GLUCOSE BLOOD QUANT: CPT

## 2024-01-31 PROCEDURE — 76376 3D RENDER W/INTRP POSTPROCES: CPT

## 2024-01-31 PROCEDURE — 92978 ENDOLUMINL IVUS OCT C 1ST: CPT | Mod: RC

## 2024-01-31 PROCEDURE — 92972 PERQ TRLUML CORONRY LITHOTRP: CPT

## 2024-01-31 PROCEDURE — 86803 HEPATITIS C AB TEST: CPT

## 2024-01-31 PROCEDURE — 85610 PROTHROMBIN TIME: CPT

## 2024-01-31 PROCEDURE — 80048 BASIC METABOLIC PNL TOTAL CA: CPT

## 2024-01-31 PROCEDURE — 83036 HEMOGLOBIN GLYCOSYLATED A1C: CPT

## 2024-01-31 PROCEDURE — 82330 ASSAY OF CALCIUM: CPT

## 2024-01-31 PROCEDURE — 93005 ELECTROCARDIOGRAM TRACING: CPT

## 2024-01-31 PROCEDURE — 82435 ASSAY OF BLOOD CHLORIDE: CPT

## 2024-01-31 PROCEDURE — C1887: CPT

## 2024-01-31 PROCEDURE — 85730 THROMBOPLASTIN TIME PARTIAL: CPT

## 2024-01-31 PROCEDURE — 71045 X-RAY EXAM CHEST 1 VIEW: CPT

## 2024-01-31 PROCEDURE — 85025 COMPLETE CBC W/AUTO DIFF WBC: CPT

## 2024-01-31 PROCEDURE — 83690 ASSAY OF LIPASE: CPT

## 2024-01-31 PROCEDURE — C1769: CPT

## 2024-01-31 PROCEDURE — C1761: CPT

## 2024-01-31 PROCEDURE — 36415 COLL VENOUS BLD VENIPUNCTURE: CPT

## 2024-01-31 PROCEDURE — 84295 ASSAY OF SERUM SODIUM: CPT

## 2024-01-31 PROCEDURE — C1894: CPT

## 2024-01-31 PROCEDURE — 80053 COMPREHEN METABOLIC PANEL: CPT

## 2024-01-31 PROCEDURE — 82803 BLOOD GASES ANY COMBINATION: CPT

## 2024-01-31 PROCEDURE — 85018 HEMOGLOBIN: CPT

## 2024-01-31 PROCEDURE — 83880 ASSAY OF NATRIURETIC PEPTIDE: CPT

## 2024-01-31 PROCEDURE — 82553 CREATINE MB FRACTION: CPT

## 2024-01-31 PROCEDURE — C1725: CPT

## 2024-01-31 PROCEDURE — 85014 HEMATOCRIT: CPT

## 2024-01-31 PROCEDURE — 99232 SBSQ HOSP IP/OBS MODERATE 35: CPT

## 2024-01-31 RX ORDER — ACETAMINOPHEN 500 MG
2 TABLET ORAL
Qty: 0 | Refills: 0 | DISCHARGE
Start: 2024-01-31

## 2024-01-31 RX ORDER — CLOPIDOGREL BISULFATE 75 MG/1
1 TABLET, FILM COATED ORAL
Qty: 30 | Refills: 0
Start: 2024-01-31 | End: 2024-02-29

## 2024-01-31 RX ORDER — ASPIRIN/CALCIUM CARB/MAGNESIUM 324 MG
1 TABLET ORAL
Qty: 30 | Refills: 0
Start: 2024-01-31 | End: 2024-02-29

## 2024-01-31 RX ADMIN — Medication 81 MILLIGRAM(S): at 12:05

## 2024-01-31 RX ADMIN — CLOPIDOGREL BISULFATE 75 MILLIGRAM(S): 75 TABLET, FILM COATED ORAL at 12:05

## 2024-01-31 RX ADMIN — CHLORHEXIDINE GLUCONATE 1 APPLICATION(S): 213 SOLUTION TOPICAL at 05:06

## 2024-01-31 RX ADMIN — PANTOPRAZOLE SODIUM 40 MILLIGRAM(S): 20 TABLET, DELAYED RELEASE ORAL at 05:02

## 2024-01-31 RX ADMIN — LIDOCAINE 1 PATCH: 4 CREAM TOPICAL at 02:33

## 2024-01-31 NOTE — DISCHARGE NOTE PROVIDER - CARE PROVIDER_API CALL
Keegan Dickerson  Thoracic and Cardiac Surgery  [address] [place] [id number]  Phone: [phone]  Fax: [phone]  Scheduled Appointment: 05/07/2024   Keegan Dickerson  Thoracic and Cardiac Surgery  [address] [place] [address]  Phone: [phone]  Fax: [phone]  Scheduled Appointment: 05/07/2024    Nelson Muniz  Interventional Cardiology  [address] [place] [address], [address]  [place] [address]  Phone: [phone]  Fax: [phone]  Follow Up Time: 1 week    Orlando Genao  Nephrology  [address] [place] [address]  Phone: [phone]  Fax: [phone]  Follow Up Time: 1 week

## 2024-01-31 NOTE — PHARMACOTHERAPY INTERVENTION NOTE - COMMENTS
Counseled patient and wife at bedside on the following inpatient/discharge medications names (brand/generic), indication, and possible side effects:  aspirin 81mg daily  plavix 75mg daily    - Stressed importance of compliance to DAPT therapy.  - Educated patient to avoid using NSAIDs  (Aleve, Motrin, ibuprofen, naproxen) while on DAPT, recommended to use Tylenol OTC for pain control (not to exceed 4gm in 24 hours)  - Patient was provided with a medication card for their new medication. Patient questions and concerns were answered and addressed. Patient demonstrated understanding.  - Pt would like all discharge RXs sent to [facility] pharmacy.    Patient was provided with a medication card for their new medication. Patient questions and concerns were answered and addressed. Patient demonstrated understanding.    Nawaf SpearD, BCPS  Clinical Pharmacy Specialist  Teams (preferred) or [phone]

## 2024-01-31 NOTE — PROGRESS NOTE ADULT - SUBJECTIVE AND OBJECTIVE BOX
SUBJECTIVE / OVERNIGHT EVENTS:      Patient seen and examined at bedside. No events noted overnight. Resting in bed      --------------------------------------------------------------------------------------------  LABS:                        13.0   9.03  )-----------( 158      ( 28 Jan 2024 07:01 )             38.7     01-28    143  |  105  |  20  ----------------------------<  99  4.0   |  27  |  1.36<H>    Ca    9.6      28 Jan 2024 07:01    TPro  6.7  /  Alb  4.3  /  TBili  0.3  /  DBili  x   /  AST  28  /  ALT  24  /  AlkPhos  54  01-27    PT/INR - ( 27 Jan 2024 03:47 )   PT: 11.8 sec;   INR: 1.13 ratio         PTT - ( 27 Jan 2024 03:47 )  PTT:29.8 sec  CAPILLARY BLOOD GLUCOSE        CARDIAC MARKERS ( 27 Jan 2024 03:47 )  x     / x     / x     / x     / 4.6 ng/mL      Urinalysis Basic - ( 28 Jan 2024 07:01 )    Color: x / Appearance: x / SG: x / pH: x  Gluc: 99 mg/dL / Ketone: x  / Bili: x / Urobili: x   Blood: x / Protein: x / Nitrite: x   Leuk Esterase: x / RBC: x / WBC x   Sq Epi: x / Non Sq Epi: x / Bacteria: x        RADIOLOGY & ADDITIONAL TESTS: < from: Xray Chest 1 View- PORTABLE-Urgent (Xray Chest 1 View- PORTABLE-Urgent .) (01.27.24 @ 04:05) >  IMPRESSION:  No focal consolidation.    < end of copied text >      Imaging Personally Reviewed:  [x] YES  [ ] NO    Consultant(s) Notes Reviewed:  [x] YES  [ ] NO    MEDICATIONS  (STANDING):  fentaNYL   Patch 100 MICROgram(s)/Hr. 1 Patch Transdermal every 48 hours  heparin   Injectable 5000 Unit(s) SubCutaneous every 8 hours  pantoprazole    Tablet 40 milliGRAM(s) Oral before breakfast  tamsulosin 0.4 milliGRAM(s) Oral at bedtime    MEDICATIONS  (PRN):  acetaminophen     Tablet .. 650 milliGRAM(s) Oral every 6 hours PRN Temp greater or equal to 38C (100.4F), Mild Pain (1 - 3)  aluminum hydroxide/magnesium hydroxide/simethicone Suspension 30 milliLiter(s) Oral every 4 hours PRN Dyspepsia  lidocaine   4% Patch 1 Patch Transdermal every 24 hours PRN neck pain  melatonin 3 milliGRAM(s) Oral at bedtime PRN Insomnia  ondansetron Injectable 4 milliGRAM(s) IV Push every 8 hours PRN Nausea and/or Vomiting      Care Discussed with Consultants/Other Providers [x] YES  [ ] NO    Vital Signs Last 24 Hrs  T(C): 36.8 (28 Jan 2024 04:33), Max: 36.8 (27 Jan 2024 16:30)  T(F): 98.2 (28 Jan 2024 04:33), Max: 98.2 (27 Jan 2024 16:30)  HR: 65 (28 Jan 2024 04:33) (60 - 87)  BP: 123/73 (28 Jan 2024 04:33) (123/73 - 152/79)  BP(mean): 100 (27 Jan 2024 16:30) (100 - 100)  RR: 18 (28 Jan 2024 04:33) (16 - 18)  SpO2: 98% (28 Jan 2024 04:33) (98% - 99%)    Parameters below as of 28 Jan 2024 04:33  Patient On (Oxygen Delivery Method): room air      I&O's Summary        PHYSICAL EXAM:  GENERAL: NAD, well-developed, comfortable  HEAD:  Atraumatic, Normocephalic  EYES: EOMI, PERRLA, conjunctiva and sclera clear  NECK: Supple, No JVD  CHEST/LUNG: Clear to auscultation bilaterally; No wheeze  HEART: Regular rate and rhythm; +murmurs, rubs, or gallops  ABDOMEN: Soft, Nontender, Nondistended; Bowel sounds present  NEURO: AAOx3, no focal weakness, 5/5 b/l extremity strength, b/l knee no arthritis, no effusion   EXTREMITIES:  2+ Peripheral Pulses, No clubbing, cyanosis, or edema  SKIN: No rashes or lesions  
CARDIOLOGY FOLLOW UP - Dr. Castellano  DATE OF SERVICE: 1/31/24    CC  No CV complaints     REVIEW OF SYSTEMS:  CONSTITUTIONAL: No fever, weight loss, or fatigue  RESPIRATORY: No cough, wheezing, chills or hemoptysis; No Shortness of Breath  CARDIOVASCULAR: No chest pain, palpitations, passing out, dizziness, or leg swelling  GASTROINTESTINAL: No abdominal or epigastric pain. No nausea, vomiting, or hematemesis; No diarrhea or constipation. No melena or hematochezia.  VASCULAR: No edema     PHYSICAL EXAM:  T(C): 36.7 (01-31-24 @ 04:27), Max: 36.7 (01-30-24 @ 21:42)  HR: 67 (01-31-24 @ 04:27) (58 - 77)  BP: 118/70 (01-31-24 @ 04:27) (117/60 - 157/72)  RR: 18 (01-31-24 @ 04:27) (16 - 18)  SpO2: 99% (01-31-24 @ 04:27) (95% - 100%)  Wt(kg): --  I&O's Summary      Appearance: Elderly male 	  Cardiovascular: Normal S1 S2,RRR, No JVD, No murmurs  Respiratory: Lungs clear to auscultation b/l  Gastrointestinal:  Soft, Non-tender, + BS	  Extremities: Normal range of motion, No clubbing, cyanosis or edema      Home Medications:  acetaminophen 325 mg oral tablet: 2 tab(s) orally every 6 hours As needed Temp greater or equal to 38C (100.4F), Mild Pain (1 - 3) (31 Jan 2024 09:03)  Duragesic-100 transdermal film, extended release: 1 patch transdermally every other day (27 Jan 2024 15:32)  esomeprazole 20 mg oral delayed release tablet: 1 tab(s) orally once a day (27 Jan 2024 15:33)  Lidoderm 5% topical film: Apply topically to affected area (27 Jan 2024 15:33)  silodosin 4 mg oral capsule: 1 cap(s) orally once a day (at bedtime) (27 Jan 2024 15:31)      MEDICATIONS  (STANDING):  aspirin enteric coated 81 milliGRAM(s) Oral daily  chlorhexidine 2% Cloths 1 Application(s) Topical <User Schedule>  clopidogrel Tablet 75 milliGRAM(s) Oral daily  fentaNYL   Patch 100 MICROgram(s)/Hr. 1 Patch Transdermal every 48 hours  lidocaine   5% Patch 1 Patch Transdermal every 24 hours  pantoprazole    Tablet 40 milliGRAM(s) Oral before breakfast  silodosin 4 milliGRAM(s) Oral at bedtime  sodium chloride 0.9%. 1000 milliLiter(s) (75 mL/Hr) IV Continuous <Continuous>      TELEMETRY: Sinus 55-70	    ECG:  	  RADIOLOGY:   DIAGNOSTIC TESTING:  [ ] Echocardiogram:  [ ]  Catheterization:  [ ] Stress Test:    OTHER: 	    LABS:	 	    Troponin T, High Sensitivity Result: 26 ng/L [0 - 51] (01-27 @ 05:18)  CKMB Units: 4.6 ng/mL [0.0 - 6.7] (01-27 @ 03:47)  Troponin T, High Sensitivity Result: 29 ng/L [0 - 51] (01-27 @ 03:47)                          13.6   11.75 )-----------( 171      ( 31 Jan 2024 07:17 )             40.5     01-31    141  |  103  |  17  ----------------------------<  110<H>  4.1   |  26  |  1.31<H>    Ca    10.0      31 Jan 2024 07:17      PTT - ( 30 Jan 2024 07:28 )  PTT:60.3 sec        
Interventional Cardiology Post Cath Progress Note:                Subjective:   Patient feels well- no current complaints- Denies chest pain, shortness of breath. Denies pain, numbness, tingling or swelling around wrist access site    MEDICATIONS  (STANDING):  aspirin enteric coated 81 milliGRAM(s) Oral daily  chlorhexidine 2% Cloths 1 Application(s) Topical <User Schedule>  clopidogrel Tablet 75 milliGRAM(s) Oral daily  fentaNYL   Patch 100 MICROgram(s)/Hr. 1 Patch Transdermal every 48 hours  lidocaine   5% Patch 1 Patch Transdermal every 24 hours  pantoprazole    Tablet 40 milliGRAM(s) Oral before breakfast  silodosin 4 milliGRAM(s) Oral at bedtime  sodium chloride 0.9%. 1000 milliLiter(s) (75 mL/Hr) IV Continuous <Continuous>    MEDICATIONS  (PRN):  acetaminophen     Tablet .. 650 milliGRAM(s) Oral every 6 hours PRN Temp greater or equal to 38C (100.4F), Mild Pain (1 - 3)  aluminum hydroxide/magnesium hydroxide/simethicone Suspension 30 milliLiter(s) Oral every 4 hours PRN Dyspepsia  melatonin 3 milliGRAM(s) Oral at bedtime PRN Insomnia  ondansetron Injectable 4 milliGRAM(s) IV Push every 8 hours PRN Nausea and/or Vomiting      Objective:  Vital Signs Last 24 Hrs  T(C): 36.7 (31 Jan 2024 04:27), Max: 36.7 (30 Jan 2024 21:42)  T(F): 98 (31 Jan 2024 04:27), Max: 98 (30 Jan 2024 21:42)  HR: 67 (31 Jan 2024 04:27) (58 - 77)  BP: 118/70 (31 Jan 2024 04:27) (117/60 - 157/72)  BP(mean): --  RR: 18 (31 Jan 2024 04:27) (16 - 18)  SpO2: 99% (31 Jan 2024 04:27) (95% - 100%)    Parameters below as of 31 Jan 2024 04:27  Patient On (Oxygen Delivery Method): room air                                13.6   11.75 )-----------( 171      ( 31 Jan 2024 07:17 )             40.5     01-30    141  |  103  |  18  ----------------------------<  113<H>  3.9   |  27  |  1.31<H>    Ca    9.6      30 Jan 2024 07:25      PTT - ( 30 Jan 2024 07:28 )  PTT:60.3 sec  Urinalysis Basic - ( 30 Jan 2024 07:25 )    Color: x / Appearance: x / SG: x / pH: x  Gluc: 113 mg/dL / Ketone: x  / Bili: x / Urobili: x   Blood: x / Protein: x / Nitrite: x   Leuk Esterase: x / RBC: x / WBC x   Sq Epi: x / Non Sq Epi: x / Bacteria: x      Physical Exam:  No apparent distress, alert and oriented times three, appropriate affect  JVD is not elevated, supple  Clear to auscultation with no wheezing, ronchi or crackles  Regular rate and rhythm with no murmur, rub or gallop  Positive bowel sounds, soft, non-tender, non-distended, no masses/guarding or rebound tenderness  Right Upper Extremity: Soft, non tender, no bleeding or hematoma, clean/dry/intact- RUE +2 palpable radial pulse  No clubbing, cyanosis or edema      Assessment/Plan: 73y Male PMHx Aortic stenosis, chest pain s/p diagnostic LHC via RRA access mRCA staged 95% 1/29 then PCI HAROON x 1 p/m RCA 95% via RRA access on 1/30.    - Continue DAPT (aspirin 81mg and clopidogrel 75mg),   - start Repatha o/p (pt is intolerant to statins)  - EF 55%  - Recommend a heart healthy diet which includes a variety of fruits and vegetables, whole grains, low fat dairy products, legumes and skinless poultry and fish; food prepared with little or no salt and minimize processed foods  - Avoid using NSAIDs  (Aleve, Motrin, ibuprofen, naproxen) while on DAPT, please utilize Tylenol for pain control (not to exceed 4gm in 24 hours)  - Care per primary team      ALL other care and medical management as per primary team.      GUILLERMO WhitfieldC  Cardiology Cath Service    Please check [email or web address] password [email or web address] for cardiology service schedule and contact information via TEAMS.  
SUBJECTIVE / OVERNIGHT EVENTS:    patient seen and examined  resting comfortably in bed  plan for PCI today  --------------------------------------------------------------------------------------------  LABS:                        12.8   10.25 )-----------( 162      ( 30 Jan 2024 07:27 )             38.4     01-30    141  |  103  |  18  ----------------------------<  113<H>  3.9   |  27  |  1.31<H>    Ca    9.6      30 Jan 2024 07:25      PTT - ( 30 Jan 2024 07:28 )  PTT:60.3 sec  CAPILLARY BLOOD GLUCOSE            Urinalysis Basic - ( 30 Jan 2024 07:25 )    Color: x / Appearance: x / SG: x / pH: x  Gluc: 113 mg/dL / Ketone: x  / Bili: x / Urobili: x   Blood: x / Protein: x / Nitrite: x   Leuk Esterase: x / RBC: x / WBC x   Sq Epi: x / Non Sq Epi: x / Bacteria: x        RADIOLOGY & ADDITIONAL TESTS:    Imaging Personally Reviewed:  [x] YES  [ ] NO    Consultant(s) Notes Reviewed:  [x] YES  [ ] NO    MEDICATIONS  (STANDING):  aspirin enteric coated 81 milliGRAM(s) Oral daily  chlorhexidine 2% Cloths 1 Application(s) Topical <User Schedule>  clopidogrel Tablet 75 milliGRAM(s) Oral daily  fentaNYL   Patch 100 MICROgram(s)/Hr. 1 Patch Transdermal every 48 hours  heparin  Infusion.  Unit(s)/Hr (10 mL/Hr) IV Continuous <Continuous>  pantoprazole    Tablet 40 milliGRAM(s) Oral before breakfast  silodosin 4 milliGRAM(s) Oral at bedtime    MEDICATIONS  (PRN):  acetaminophen     Tablet .. 650 milliGRAM(s) Oral every 6 hours PRN Temp greater or equal to 38C (100.4F), Mild Pain (1 - 3)  aluminum hydroxide/magnesium hydroxide/simethicone Suspension 30 milliLiter(s) Oral every 4 hours PRN Dyspepsia  heparin   Injectable 5000 Unit(s) IV Push every 6 hours PRN For aPTT less than 40  lidocaine   4% Patch 1 Patch Transdermal every 24 hours PRN neck pain  melatonin 3 milliGRAM(s) Oral at bedtime PRN Insomnia  ondansetron Injectable 4 milliGRAM(s) IV Push every 8 hours PRN Nausea and/or Vomiting      Care Discussed with Consultants/Other Providers [x] YES  [ ] NO    Vital Signs Last 24 Hrs  T(C): 36.5 (29 Jan 2024 21:25), Max: 36.9 (29 Jan 2024 13:06)  T(F): 97.7 (29 Jan 2024 21:25), Max: 98.5 (29 Jan 2024 17:10)  HR: 59 (29 Jan 2024 21:25) (59 - 93)  BP: 138/73 (29 Jan 2024 21:25) (114/67 - 184/93)  BP(mean): 106 (29 Jan 2024 16:30) (96 - 127)  RR: 18 (29 Jan 2024 21:25) (18 - 20)  SpO2: 98% (29 Jan 2024 21:25) (94% - 100%)    Parameters below as of 29 Jan 2024 21:25  Patient On (Oxygen Delivery Method): room air      I&O's Summary    PHYSICAL EXAM:  GENERAL: NAD, well-developed, comfortable  HEAD:  Atraumatic, Normocephalic  EYES: EOMI, PERRLA, conjunctiva and sclera clear  NECK: Supple, No JVD  CHEST/LUNG: Clear to auscultation bilaterally; No wheeze  HEART: Regular rate and rhythm; +murmurs, rubs, or gallops  ABDOMEN: Soft, Nontender, Nondistended; Bowel sounds present  NEURO: AAOx3, no focal weakness, 5/5 b/l extremity strength, b/l knee no arthritis, no effusion   EXTREMITIES:  2+ Peripheral Pulses, No clubbing, cyanosis, or edema  SKIN: No rashes or lesions, right radial site c/d/i, + pulses, no hematoma    
SUBJECTIVE / OVERNIGHT EVENTS:    patient seen and examined  resting comfortably in bed  sp cath    --------------------------------------------------------------------------------------------  LABS:                        13.6   11.75 )-----------( 171      ( 31 Jan 2024 07:17 )             40.5     01-31    141  |  103  |  17  ----------------------------<  110<H>  4.1   |  26  |  1.31<H>    Ca    10.0      31 Jan 2024 07:17      PTT - ( 30 Jan 2024 07:28 )  PTT:60.3 sec  CAPILLARY BLOOD GLUCOSE            Urinalysis Basic - ( 31 Jan 2024 07:17 )    Color: x / Appearance: x / SG: x / pH: x  Gluc: 110 mg/dL / Ketone: x  / Bili: x / Urobili: x   Blood: x / Protein: x / Nitrite: x   Leuk Esterase: x / RBC: x / WBC x   Sq Epi: x / Non Sq Epi: x / Bacteria: x        RADIOLOGY & ADDITIONAL TESTS:    Imaging Personally Reviewed:  [x] YES  [ ] NO    Consultant(s) Notes Reviewed:  [x] YES  [ ] NO    MEDICATIONS  (STANDING):  aspirin enteric coated 81 milliGRAM(s) Oral daily  chlorhexidine 2% Cloths 1 Application(s) Topical <User Schedule>  clopidogrel Tablet 75 milliGRAM(s) Oral daily  fentaNYL   Patch 100 MICROgram(s)/Hr. 1 Patch Transdermal every 48 hours  lidocaine   5% Patch 1 Patch Transdermal every 24 hours  pantoprazole    Tablet 40 milliGRAM(s) Oral before breakfast  silodosin 4 milliGRAM(s) Oral at bedtime  sodium chloride 0.9%. 1000 milliLiter(s) (75 mL/Hr) IV Continuous <Continuous>    MEDICATIONS  (PRN):  acetaminophen     Tablet .. 650 milliGRAM(s) Oral every 6 hours PRN Temp greater or equal to 38C (100.4F), Mild Pain (1 - 3)  aluminum hydroxide/magnesium hydroxide/simethicone Suspension 30 milliLiter(s) Oral every 4 hours PRN Dyspepsia  melatonin 3 milliGRAM(s) Oral at bedtime PRN Insomnia  ondansetron Injectable 4 milliGRAM(s) IV Push every 8 hours PRN Nausea and/or Vomiting      Care Discussed with Consultants/Other Providers [x] YES  [ ] NO    Vital Signs Last 24 Hrs  T(C): 36.7 (31 Jan 2024 04:27), Max: 36.7 (30 Jan 2024 21:42)  T(F): 98 (31 Jan 2024 04:27), Max: 98 (30 Jan 2024 21:42)  HR: 67 (31 Jan 2024 04:27) (58 - 77)  BP: 118/70 (31 Jan 2024 04:27) (117/60 - 157/72)  BP(mean): --  RR: 18 (31 Jan 2024 04:27) (16 - 18)  SpO2: 99% (31 Jan 2024 04:27) (95% - 100%)    Parameters below as of 31 Jan 2024 04:27  Patient On (Oxygen Delivery Method): room air      I&O's Summary    PHYSICAL EXAM:  GENERAL: NAD, well-developed, comfortable  HEAD:  Atraumatic, Normocephalic  EYES: EOMI, PERRLA, conjunctiva and sclera clear  NECK: Supple, No JVD  CHEST/LUNG: Clear to auscultation bilaterally; No wheeze  HEART: Regular rate and rhythm; +murmurs, rubs, or gallops  ABDOMEN: Soft, Nontender, Nondistended; Bowel sounds present  NEURO: AAOx3, no focal weakness, 5/5 b/l extremity strength, b/l knee no arthritis, no effusion   EXTREMITIES:  2+ Peripheral Pulses, No clubbing, cyanosis, or edema  SKIN: No rashes or lesions, right radial site c/d/i, + pulses, no hematoma    
CARDIOLOGY FOLLOW UP - Dr. Castellano  DATE OF SERVICE: 1/30/24    CC  No CV complaints     REVIEW OF SYSTEMS:  CONSTITUTIONAL: No fever, weight loss, or fatigue  RESPIRATORY: No cough, wheezing, chills or hemoptysis; No Shortness of Breath  CARDIOVASCULAR: No chest pain, palpitations, passing out, dizziness, or leg swelling  GASTROINTESTINAL: No abdominal or epigastric pain. No nausea, vomiting, or hematemesis; No diarrhea or constipation. No melena or hematochezia.  VASCULAR: No edema     PHYSICAL EXAM:  T(C): 36.5 (01-29-24 @ 21:25), Max: 36.9 (01-29-24 @ 13:06)  HR: 59 (01-29-24 @ 21:25) (59 - 93)  BP: 138/73 (01-29-24 @ 21:25) (114/67 - 184/93)  RR: 18 (01-29-24 @ 21:25) (18 - 20)  SpO2: 98% (01-29-24 @ 21:25) (94% - 100%)  Wt(kg): --  I&O's Summary      Appearance: Elderly male 	  Cardiovascular: Normal S1 S2,RRR, No JVD, No murmurs  Respiratory: Lungs clear to auscultation b/l 	  Gastrointestinal:  Soft, Non-tender, + BS	  Extremities: Normal range of motion, No clubbing, cyanosis or edema      Home Medications:  Duragesic-100 transdermal film, extended release: 1 patch transdermally every other day (27 Jan 2024 15:32)  esomeprazole 20 mg oral delayed release tablet: 1 tab(s) orally once a day (27 Jan 2024 15:33)  Lidoderm 5% topical film: Apply topically to affected area (27 Jan 2024 15:33)  silodosin 4 mg oral capsule: 1 cap(s) orally once a day (at bedtime) (27 Jan 2024 15:31)      MEDICATIONS  (STANDING):  aspirin enteric coated 81 milliGRAM(s) Oral daily  chlorhexidine 2% Cloths 1 Application(s) Topical <User Schedule>  clopidogrel Tablet 75 milliGRAM(s) Oral daily  fentaNYL   Patch 100 MICROgram(s)/Hr. 1 Patch Transdermal every 48 hours  heparin  Infusion.  Unit(s)/Hr (10 mL/Hr) IV Continuous <Continuous>  pantoprazole    Tablet 40 milliGRAM(s) Oral before breakfast  silodosin 4 milliGRAM(s) Oral at bedtime      TELEMETRY: NSR 60s 	    ECG:  	  RADIOLOGY:   DIAGNOSTIC TESTING:  [ ] Echocardiogram:  [x]  Catheterization:  < from: Cardiac Catheterization (01.29.24 @ 13:37) >  Successful coronary angiography from right radial access site.     Severe mid-RCA disease, culprit. Patient unable to tolerate procedure  due to acute on chronic back pain.  He requests stopping  for today and planning on staged intervention tomorrow.     MODERATE SEDATION STATEMENT: Moderate sedation was administered and  monitored under the direct supervision of  FELIPE GALLEGOS MD from 13:37 to 14:14.   Recommendations:     Return to inpatient status for continued monitoring and access site  management.    Patient loaded with ASA 325mg and plavix 600mg.  Continue ASA 81mg QD  and plavix 75mg QD.    < end of copied text >    [ ] Stress Test:    OTHER: 	    LABS:	 	    Troponin T, High Sensitivity Result: 26 ng/L [0 - 51] (01-27 @ 05:18)  CKMB Units: 4.6 ng/mL [0.0 - 6.7] (01-27 @ 03:47)  Troponin T, High Sensitivity Result: 29 ng/L [0 - 51] (01-27 @ 03:47)                          12.8   10.25 )-----------( 162      ( 30 Jan 2024 07:27 )             38.4     01-30    141  |  103  |  18  ----------------------------<  113<H>  3.9   |  27  |  1.31<H>    Ca    9.6      30 Jan 2024 07:25      PTT - ( 30 Jan 2024 07:28 )  PTT:60.3 sec        
SUBJECTIVE / OVERNIGHT EVENTS:     No events noted overnight. Resting in bed. Awaiting Cards eval    --------------------------------------------------------------------------------------------  LABS:                        13.0   9.03  )-----------( 158      ( 28 Jan 2024 07:01 )             38.7     01-28    143  |  105  |  20  ----------------------------<  99  4.0   |  27  |  1.36<H>    Ca    9.6      28 Jan 2024 07:01        CAPILLARY BLOOD GLUCOSE            Urinalysis Basic - ( 28 Jan 2024 07:01 )    Color: x / Appearance: x / SG: x / pH: x  Gluc: 99 mg/dL / Ketone: x  / Bili: x / Urobili: x   Blood: x / Protein: x / Nitrite: x   Leuk Esterase: x / RBC: x / WBC x   Sq Epi: x / Non Sq Epi: x / Bacteria: x        RADIOLOGY & ADDITIONAL TESTS:< from: TTE Limited W or WO Ultrasound Enhancing Agent (01.28.24 @ 08:12) >  CONCLUSIONS:      1. Left ventricular cavity is normal. Left ventricular wall thickness is normal. Left ventricular systolic function is normal with an ejection fraction of 55 % by 3D. There are no regional wall motion abnormalities seen.   2. Normal right ventricular cavity size and normal systolic function.   3. Structurally normal mitral valve with normal leaflet excursion. There is calcification of the mitral valve annulus. There is trace mitral regurgitation.   4. The aortic valve anatomy cannot be determined with reduced systolic excursion. There is calcification of the aortic valve leaflets. There is moderate aortic stenosis. The peak transaortic velocity is 3.45 m/s, peak transaortic gradient is 47.6 mmHg and mean transaortic gradient is 26.0 mmHg with an LVOT/aortic valve VTI ratio of 0.35. The aortic valve area is estimated at 1.21 cm² by the continuity equation. There is mild to moderate aortic regurgitation.   5. No prior echocardiogram is available for comparison.    < end of copied text >      Imaging Personally Reviewed:  [x] YES  [ ] NO    Consultant(s) Notes Reviewed:  [x] YES  [ ] NO    MEDICATIONS  (STANDING):  fentaNYL   Patch 100 MICROgram(s)/Hr. 1 Patch Transdermal every 48 hours  heparin   Injectable 5000 Unit(s) SubCutaneous every 8 hours  pantoprazole    Tablet 40 milliGRAM(s) Oral before breakfast  tamsulosin 0.4 milliGRAM(s) Oral at bedtime    MEDICATIONS  (PRN):  acetaminophen     Tablet .. 650 milliGRAM(s) Oral every 6 hours PRN Temp greater or equal to 38C (100.4F), Mild Pain (1 - 3)  aluminum hydroxide/magnesium hydroxide/simethicone Suspension 30 milliLiter(s) Oral every 4 hours PRN Dyspepsia  lidocaine   4% Patch 1 Patch Transdermal every 24 hours PRN neck pain  melatonin 3 milliGRAM(s) Oral at bedtime PRN Insomnia  ondansetron Injectable 4 milliGRAM(s) IV Push every 8 hours PRN Nausea and/or Vomiting      Care Discussed with Consultants/Other Providers [x] YES  [ ] NO    Vital Signs Last 24 Hrs  T(C): 37.2 (29 Jan 2024 05:10), Max: 37.2 (29 Jan 2024 05:10)  T(F): 98.9 (29 Jan 2024 05:10), Max: 98.9 (29 Jan 2024 05:10)  HR: 60 (29 Jan 2024 05:10) (54 - 68)  BP: 130/72 (29 Jan 2024 05:10) (122/66 - 146/62)  BP(mean): --  RR: 20 (29 Jan 2024 05:10) (16 - 20)  SpO2: 98% (29 Jan 2024 05:10) (98% - 100%)    Parameters below as of 29 Jan 2024 05:10  Patient On (Oxygen Delivery Method): room air      I&O's Summary    28 Jan 2024 07:01  -  29 Jan 2024 07:00  --------------------------------------------------------  IN: 600 mL / OUT: 0 mL / NET: 600 mL      PHYSICAL EXAM:  GENERAL: NAD, well-developed, comfortable  HEAD:  Atraumatic, Normocephalic  EYES: EOMI, PERRLA, conjunctiva and sclera clear  NECK: Supple, No JVD  CHEST/LUNG: Clear to auscultation bilaterally; No wheeze  HEART: Regular rate and rhythm; +murmurs, rubs, or gallops  ABDOMEN: Soft, Nontender, Nondistended; Bowel sounds present  NEURO: AAOx3, no focal weakness, 5/5 b/l extremity strength, b/l knee no arthritis, no effusion   EXTREMITIES:  2+ Peripheral Pulses, No clubbing, cyanosis, or edema  SKIN: No rashes or lesions

## 2024-01-31 NOTE — DISCHARGE NOTE PROVIDER - CARE PROVIDERS DIRECT ADDRESSES
,[email or web address]

## 2024-01-31 NOTE — DISCHARGE NOTE PROVIDER - HOSPITAL COURSE
HPI:  73M PMH aortic stenosis, GERD and prostate cancer presents to the hospital with chest pain. Patient reports that he has chronic chest pain, however this has been worsening prior to admission. Patient describes pain as burning, and likens discomfort to heartburn, but reports sensation is higher up. Denies chest pressure. Denies radiation. Denies palpitations. Denies syncope or near syncope. Denies radiation of pain. Patient reports that pain is not changed with positional changes.   ROS otherwise noncontributory.   Of note, the patient has a scheduled appointment with cardiology on 1/30 for final planning for possible aortic valve surgery.     In ED, VSS. CBC with mild normocytic anemia. CMP with mild elevation in Cr but otherwise noncontributory. Troponin not elevated 2x. BNP not elevated. EKG with NSR. CXR clear. Patient now for admission for further evaluation and treatment of chest pain.  (27 Jan 2024 15:25)    Hospital Course:  # Chest Pain  -EKG SR with bifascicular block  -HST negative x2  -CXR clear  - s/p diagnostic LHC via RRA access mRCA staged 95% 1/29 then PCI HAROON x 1 p/m RCA 95% via RRA access on 1/30.  - Continue DAPT (aspirin 81mg and clopidogrel 75mg),   - start Repatha o/p (pt is intolerant to statins)  - EF 55%    #  Moderate AS  - Echo with nml lv fxn, moderate AS   - Structural heart consulted   - Follow up with Dr. Dickerson on 5/7/2024. Repeat echocardiogram will be done at that time.    # DARIUS:  - Creatinine 1.3  - Avoid nephrotoxins  - Renally dose medications    # Prostate ca:  - Continue Silodosin     # GERD:  - Continue Esomeprazole    Important Medication Changes and Reason:  - Continue DAPT (aspirin 81mg and clopidogrel 75mg),   - start Repatha o/p (pt is intolerant to statins)    Active or Pending Issues Requiring Follow-up:  - Follow up with Dr. Dickerson on 5/7/2024. Repeat echocardiogram will be done at that time    Advanced Directives:   [ x] Full code  [ ] DNR  [ ] Hospice    Discharge Diagnoses:  # Chest Pain  # moderate AS  # DARIUS  # Prostate Ca  # GERD

## 2024-01-31 NOTE — DISCHARGE NOTE PROVIDER - NSDCCPCAREPLAN_GEN_ALL_CORE_FT
PRINCIPAL DISCHARGE DIAGNOSIS  Diagnosis: Chest pain  Assessment and Plan of Treatment: You had a left heart cardiac catherization on 1/29/24 adn found to have a 95% block on right coronary heart artery and a stent was placed on the 1/30/24    - Take aspirin 81mg and clopidogrel 75mg daily as direction - DO NOT STOP UNLESS YOU SPEAK TO YOUR CARDIOLOGIST BECAUSE IT CAN CLOG THE STENT  - Avoid using NSAIDs  (Aleve, Motrin, ibuprofen, naproxen) while on Aspirin adn Plavix, please utilize Tylenol for pain control (not to exceed 4gm in 24 hours)  - start Repatha outpatient (pt is intolerant to statins)  - Echo showed EF 55%  - Follow up with cardiologist in 7 - 10 days  HOME CARE INSTRUCTIONS  For the next few days, avoid physical activities that bring on chest pain. Continue physical activities as directed.  Do not smoke.  Avoid drinking alcohol.   - Recommend a heart healthy diet which includes a variety of fruits and vegetables, whole grains, low fat dairy products, legumes and skinless poultry and fish; food prepared with little or no salt and minimize processed foods  SEEK IMMEDIATE MEDICAL CARE IF:  You have increased chest pain or pain that spreads to your arm, neck, jaw, back, or abdomen, shortness of breath, an increasing cough, or you are coughing up blood.  You have severe back or abdominal pain, feel nauseous, or vomit.  You develop severe weakness, fainting, or chills.  You have a fever.  THIS IS AN EMERGENCY. Do not wait to see if the pain will go away. Get medical help at once. Call your local emergency services (_______[phone]______________). Do not drive yourself to the hospital.        SECONDARY DISCHARGE DIAGNOSES  Diagnosis: Moderate aortic stenosis  Assessment and Plan of Treatment: - Echo with normal function, moderate Aortic stenosis  - Follow up with Dr. Dickerson on 5/7/2024. Repeat echocardiogram will be done at that time.     PRINCIPAL DISCHARGE DIAGNOSIS  Diagnosis: Chest pain  Assessment and Plan of Treatment: You had a left heart cardiac catherization on 1/29/24 adn found to have a 95% block on right coronary heart artery and a stent was placed on the 1/30/24    - Take aspirin 81mg and clopidogrel 75mg daily as direction - DO NOT STOP UNLESS YOU SPEAK TO YOUR CARDIOLOGIST BECAUSE IT CAN CLOG THE STENT  - Avoid using NSAIDs  (Aleve, Motrin, ibuprofen, naproxen) while on Aspirin adn Plavix, please utilize Tylenol for pain control (not to exceed 4gm in 24 hours)  - start Repatha outpatient (pt is intolerant to statins)  - Echo showed EF 55%  - Follow up with cardiologist in 7 - 10 days  HOME CARE INSTRUCTIONS  For the next few days, avoid physical activities that bring on chest pain. Continue physical activities as directed.  Do not smoke.  Avoid drinking alcohol.   - Recommend a heart healthy diet which includes a variety of fruits and vegetables, whole grains, low fat dairy products, legumes and skinless poultry and fish; food prepared with little or no salt and minimize processed foods  SEEK IMMEDIATE MEDICAL CARE IF:  You have increased chest pain or pain that spreads to your arm, neck, jaw, back, or abdomen, shortness of breath, an increasing cough, or you are coughing up blood.  You have severe back or abdominal pain, feel nauseous, or vomit.  You develop severe weakness, fainting, or chills.  You have a fever.  THIS IS AN EMERGENCY. Do not wait to see if the pain will go away. Get medical help at once. Call your local emergency services (_______[phone]______________). Do not drive yourself to the hospital.        SECONDARY DISCHARGE DIAGNOSES  Diagnosis: Moderate aortic stenosis  Assessment and Plan of Treatment: - Echo with normal function, moderate Aortic stenosis  - Follow up with Dr. Dickerson on 5/7/2024. Repeat echocardiogram will be done at that time.    Diagnosis: DARIUS (acute kidney injury)  Assessment and Plan of Treatment: - Creatinine 1.3  Avoid taking (NSAIDs) - (ex: Ibuprofen, Advil, Celebrex, Naprosyn)  Avoid taking any nephrotoxic agents (can harm kidneys) - Intravenous contrast for diagnostic testing, combination cold medications.  Have all medications adjusted for your renal function by your Health Care Provider.  Follow up with primary physician to monitor kidney function      Diagnosis: Prostate CA  Assessment and Plan of Treatment: - Continue Silodosin   - Follow upwith Urologist as scheduled      Diagnosis: GERD (gastroesophageal reflux disease)  Assessment and Plan of Treatment: - Continue Esomeprazole     PRINCIPAL DISCHARGE DIAGNOSIS  Diagnosis: Chest pain  Assessment and Plan of Treatment: You had a left heart cardiac catherization on 1/29/24 adn found to have a 95% block on right coronary heart artery and a stent was placed on the 1/30/24    - Take aspirin 81mg and clopidogrel 75mg daily as direction - DO NOT STOP UNLESS YOU SPEAK TO YOUR CARDIOLOGIST BECAUSE IT CAN CLOG THE STENT  - Avoid using NSAIDs  (Aleve, Motrin, ibuprofen, naproxen) while on Aspirin adn Plavix, please utilize Tylenol for pain control (not to exceed 4gm in 24 hours)  - start Repatha outpatient (you are intolerant to statins - cholesterol lowering medication)  - Echo showed EF 55%  - Follow up with cardiologist - Dr. Muniz in 7 - 10 days  HOME CARE INSTRUCTIONS  For the next few days, avoid physical activities that bring on chest pain. Continue physical activities as directed.  Do not smoke.  Avoid drinking alcohol.   - Recommend a heart healthy diet which includes a variety of fruits and vegetables, whole grains, low fat dairy products, legumes and skinless poultry and fish; food prepared with little or no salt and minimize processed foods  SEEK IMMEDIATE MEDICAL CARE IF:  You have increased chest pain or pain that spreads to your arm, neck, jaw, back, or abdomen, shortness of breath, an increasing cough, or you are coughing up blood.  You have severe back or abdominal pain, feel nauseous, or vomit.  You develop severe weakness, fainting, or chills.  You have a fever.  THIS IS AN EMERGENCY. Do not wait to see if the pain will go away. Get medical help at once. Call your local emergency services (_______[phone]______________). Do not drive yourself to the hospital.        SECONDARY DISCHARGE DIAGNOSES  Diagnosis: Moderate aortic stenosis  Assessment and Plan of Treatment: - Echo with normal function, moderate Aortic stenosis  - Follow up with Dr. Dickerson on 5/7/2024. Repeat echocardiogram will be done at that time.    Diagnosis: DARIUS (acute kidney injury)  Assessment and Plan of Treatment: - Creatinine 1.3  Avoid taking (NSAIDs) - (ex: Ibuprofen, Advil, Celebrex, Naprosyn)  Avoid taking any nephrotoxic agents (can harm kidneys) - Intravenous contrast for diagnostic testing, combination cold medications.  Have all medications adjusted for your renal function by your Health Care Provider.  Follow up with primary physician to monitor kidney function      Diagnosis: Prostate CA  Assessment and Plan of Treatment: - Continue Silodosin   - Follow upwith Urologist as scheduled      Diagnosis: GERD (gastroesophageal reflux disease)  Assessment and Plan of Treatment: - Continue Esomeprazole

## 2024-01-31 NOTE — DISCHARGE NOTE PROVIDER - NSDCCPTREATMENT_GEN_ALL_CORE_FT
PRINCIPAL PROCEDURE  Procedure: Left heart catheterization  Findings and Treatment: No heavy lifting or pushing/pulling with procedure arm for 2 weeks. No driving for 2 days. You may shower 24 hours following the procedure but avoid baths/swimming for 1 week. Check your wrist site for bleeding and/or swelling daily following procedure and call your doctor immediately if it occurs or if you experience increased pain at the site, cold or discoloration of arm. Follow up with your cardiologist in 1-2 weeks. You may call [facility] if you have any questions/concerns regarding your procedure [phone].

## 2024-01-31 NOTE — DISCHARGE NOTE PROVIDER - PROVIDER TOKENS
PROVIDER:[TOKEN:[[id number]],SCHEDULEDAPPT:[05/07/2024]] PROVIDER:[TOKEN:[[id number]],SCHEDULEDAPPT:[05/07/2024]],PROVIDER:[TOKEN:[[id number]],FOLLOWUP:[1 week]],PROVIDER:[TOKEN:[[id number]],FOLLOWUP:[1 week]]

## 2024-01-31 NOTE — DISCHARGE NOTE NURSING/CASE MANAGEMENT/SOCIAL WORK - PATIENT PORTAL LINK FT
You can access the FollowMyHealth Patient Portal offered by [facility] by registering at the following website: [email or web address]. By joining [employer]’s FollowMyHealth portal, you will also be able to view your health information using other applications (apps) compatible with our system.

## 2024-01-31 NOTE — PROGRESS NOTE ADULT - ASSESSMENT
A/p  73M PMH aortic stenosis, GERD and prostate cancer presents to the hospital with chest pain.    #Chest Pain  -Atypical chest pain, no concern for acs  -EKG SR with bifascicular block  -HST negative x2  -CXR clear  -No active CP on my exam  -Echo with nml lv fxn, moderate AS   -Has not had ischemic eval > 15 years  -Sp cardiac cath with PCI to mid-RCA    #CAD  -Sp cardiac cath with PCI to mid-RCA 1/30  -Cont asa, statin, plavix    #Aortic Stenosis  -Moderate on echo  -Appreciate structural eval  -To f/u outpt       dvt ppx   A/p  73M PMH aortic stenosis, GERD and prostate cancer presents to the hospital with chest pain.    #Chest Pain  -Atypical chest pain, no concern for acs  -EKG SR with bifascicular block  -HST negative x2  -CXR clear  -No active CP on my exam  -Echo with nml lv fxn, moderate AS   -Has not had ischemic eval > 15 years  -Sp cardiac cath with PCI to mid-RCA    #CAD  -Sp cardiac cath with PCI to mid-RCA 1/30  -Cont asa, plavix  -To start repatha as outpt (intolerant to statins)    #Aortic Stenosis  -Moderate on echo  -Appreciate structural eval  -To f/u outpt       dvt ppx

## 2024-01-31 NOTE — DISCHARGE NOTE PROVIDER - NSDCFUSCHEDAPPT_GEN_ALL_CORE_FT
Orlando Lewis  [facility]  UROLOGY [address] [facility]  Scheduled Appointment: 03/18/2024

## 2024-01-31 NOTE — CHART NOTE - NSCHARTNOTEFT_GEN_A_CORE
HPI:  73M PMH aortic stenosis, GERD and prostate cancer presents to the hospital with chest pain. Patient reports that he has chronic chest pain, however this has been worsening prior to admission. Patient describes pain as burning, and likens discomfort to heartburn, but reports sensation is higher up. Denies chest pressure. Denies radiation. Denies palpitations. Denies syncope or near syncope. Denies radiation of pain. Patient reports that pain is not changed with positional changes.     Pt is s/p stage PCI mRCA.   R radial site dressing dry and intact, no ecchymosis or hematoma noted.  +2 pulse to palpation, pt denies pain, numbness or tingling, full ROM present.    Vital Signs Last 24 Hrs  T(C): 36.7 (30 Jan 2024 21:42), Max: 36.7 (30 Jan 2024 21:42)  T(F): 98 (30 Jan 2024 21:42), Max: 98 (30 Jan 2024 21:42)  HR: 63 (30 Jan 2024 21:42) (58 - 77)  BP: 157/72 (30 Jan 2024 21:42) (117/60 - 157/72)  BP(mean): --  RR: 18 (30 Jan 2024 21:42) (16 - 18)  SpO2: 97% (30 Jan 2024 21:42) (95% - 100%)    Parameters below as of 30 Jan 2024 21:42  Patient On (Oxygen Delivery Method): room air    Emelin Reyes Monegro, NP   Medicine Department   [facility] [id number]

## 2024-01-31 NOTE — DISCHARGE NOTE PROVIDER - NSDCMRMEDTOKEN_GEN_ALL_CORE_FT
acetaminophen 325 mg oral tablet: 2 tab(s) orally every 6 hours As needed Temp greater or equal to 38C (100.4F), Mild Pain (1 - 3)  Duragesic-100 transdermal film, extended release: 1 patch transdermally every other day  esomeprazole 20 mg oral delayed release tablet: 1 tab(s) orally once a day  Lidoderm 5% topical film: Apply topically to affected area  silodosin 4 mg oral capsule: 1 cap(s) orally once a day (at bedtime)   acetaminophen 325 mg oral tablet: 2 tab(s) orally every 6 hours As needed Temp greater or equal to 38C (100.4F), Mild Pain (1 - 3)  aspirin 81 mg oral delayed release tablet: 1 tab(s) orally once a day  clopidogrel 75 mg oral tablet: 1 tab(s) orally once a day  Duragesic-100 transdermal film, extended release: 1 patch transdermally every other day  esomeprazole 20 mg oral delayed release tablet: 1 tab(s) orally once a day  Lidoderm 5% topical film: Apply topically to affected area  silodosin 4 mg oral capsule: 1 cap(s) orally once a day (at bedtime)

## 2024-01-31 NOTE — PROGRESS NOTE ADULT - PROVIDER SPECIALTY LIST ADULT
Internal Medicine
Internal Medicine
Intervent Cardiology
Cardiology
Cardiology
Internal Medicine
Internal Medicine

## 2024-01-31 NOTE — PROGRESS NOTE ADULT - ASSESSMENT
73M PMH AS, GERD, prostate cancer, presents with chest pain, now admitted for evaluation     Plan:    # Chest pain:  - Trops wnl  - CXR WNL  - Echo w/ EF 55%, mild to moderate aortic regurgitation  - Pain control  - Monitor on tele  - s/p diagnostic LHC via RRA access mRCA staged 95% 1/29 then PCI HAROON x 1 p/m RCA 95% via RRA access on 1/30  - C/w DAPT, Repatha o/p (pt is intolerant to statins)  - CTS consulted -> no indication for TAVR at this time    # AS:  - Echo w/ EF 55%, mild to moderate aortic regurgitation  - Monitor on tele  - CTS consulted -> no indication for TAVR at this time; Structural team will arrange for follow up with Dr. Dickerson in Structural Heart clinic in about 3 months with a repeat TTE to reassess his AS.    # DARIUS:  - Monitor I/O's  - Serial Cr  - Avoid nephrotoxins  - Renally dose medications  - Continue to monitor    # Prostate ca:  - C/w current meds  - C/w Flomax    # GERD:  - C/w current meds/ PPI    # DVT ppx:  - IPC's    [employer]  [phone]

## 2024-03-18 ENCOUNTER — APPOINTMENT (OUTPATIENT)
Dept: UROLOGY | Facility: CLINIC | Age: 74
End: 2024-03-18
Payer: MEDICARE

## 2024-03-18 VITALS
BODY MASS INDEX: 24.92 KG/M2 | HEART RATE: 92 BPM | HEIGHT: 72 IN | SYSTOLIC BLOOD PRESSURE: 122 MMHG | DIASTOLIC BLOOD PRESSURE: 72 MMHG | WEIGHT: 184 LBS

## 2024-03-18 DIAGNOSIS — C61 MALIGNANT NEOPLASM OF PROSTATE: ICD-10-CM

## 2024-03-18 DIAGNOSIS — R97.20 ELEVATED PROSTATE, SPECIFIC ANTIGEN [PSA]: ICD-10-CM

## 2024-03-18 LAB
ANION GAP SERPL CALC-SCNC: 13 MMOL/L
BUN SERPL-MCNC: 16 MG/DL
CALCIUM SERPL-MCNC: 9.9 MG/DL
CHLORIDE SERPL-SCNC: 105 MMOL/L
CO2 SERPL-SCNC: 29 MMOL/L
CREAT SERPL-MCNC: 1.34 MG/DL
EGFR: 55 ML/MIN/1.73M2
GLUCOSE SERPL-MCNC: 111 MG/DL
POTASSIUM SERPL-SCNC: 4.3 MMOL/L
PSA SERPL-MCNC: 0.63 NG/ML
SODIUM SERPL-SCNC: 147 MMOL/L

## 2024-03-18 PROCEDURE — 99214 OFFICE O/P EST MOD 30 MIN: CPT

## 2024-03-18 PROCEDURE — G2211 COMPLEX E/M VISIT ADD ON: CPT

## 2024-03-18 RX ORDER — SILODOSIN 4 MG/1
4 CAPSULE ORAL DAILY
Qty: 90 | Refills: 3 | Status: ACTIVE | COMMUNITY
Start: 2024-03-18 | End: 1900-01-01

## 2024-03-18 RX ORDER — DOCUSATE SODIUM 100 MG
100 TABLET ORAL DAILY
Qty: 15 | Refills: 0 | Status: COMPLETED | COMMUNITY
Start: 2022-09-26 | End: 2024-03-18

## 2024-03-18 RX ORDER — SODIUM PHOSPHATE, DIBASIC AND SODIUM PHOSPHATE, MONOBASIC 7; 19 G/230ML; G/230ML
ENEMA RECTAL
Qty: 1 | Refills: 0 | Status: COMPLETED | COMMUNITY
Start: 2023-10-20 | End: 2024-03-18

## 2024-03-18 NOTE — HISTORY OF PRESENT ILLNESS
[FreeTextEntry1] : PROSTATE CANCER SURGERY DELAYED PTCA ON PLAVIX BRACHYTHERAPY ON HOLD AT [facility]  Please refer to URO Consult note

## 2024-03-18 NOTE — ADDENDUM
[FreeTextEntry1] : Entered by Ricci Garcia, acting as scribe for Dr. Orlando Lewis. The documentation recorded by the scribe accurately reflects the service I personally performed and the decisions made by me.

## 2024-03-18 NOTE — LETTER BODY
[FreeTextEntry1] : Orlando Genao MD [address] [place], [place] [id number] [phone]  Reason for Visit: Elevated PSA. Prostate cancer.  This is a 73 year-old gentleman with a history of chronic back pain and hypogonadism presenting with rising PSA while on active surveillance. Patient previously had on testosterone placement therapy. Patient states that he stopped taking injections since October. His recent PSA increased to 5.73. Repeat prostate MRI demonstrated a millimeter PI-RADS 4 lesion. Prostate biopsy demonstrated [place] two cores each of 6 and 7 prostate cancer. Patient returns today to for follow-up. Since he was last seen, the patient denies any hematuria or lower urinary tract symptoms.  Patient has discontinued testosterone placement.  The patient denies any interference of function. Patient reports his surgery for PTCA was delayed. He is taking Plavix. His brachytherapy for prostate cancer is on hold at [facility].  All other review of systems are negative. He has lung cancer in his family medical history. He has previous surgical history. Past medical history, family history and social history were inquired and were noncontributory to current condition. The patient does not drink alcohol. He was a former smoker. Medications and allergies were reviewed. He has no known allergies to medication.  On examination, the patient is in no acute distress. He is alert and oriented follows commands. He has normal mood and affect. He is normocephalic. Neck is supple. Oral no thrush. Respirations are unlabored. His abdomen is soft and nontender. Bladder is nonpalpable. No CVA tenderness. Neurologically he is grossly intact. No peripheral edema. Skin without gross abnormality.   Assessment: Elevated PSA. Prostate cancer. Hypogonadism.  I counseled the patient. Patient reports his surgery for PTCA was delayed. He is taking Plavix. His brachytherapy is on hold at [facility]. The risks and benefits were discussed. Alternatives were given. I answered the patient questions. The patient will follow-up as directed and will contact me with any questions or concerns. Thank you for the opportunity to participate in the care of this patient, I will keep you updated on his progress.  Thank you for the opportunity to participate in the care of this patient. I'll keep you updated on his  progress.  Patient will continue longitudinal care for his complex and serious chronic condition.   Plan:  Follow up as directed.  Consider radiation therapy at [facility] for his prostate cancer.  I spent 30-minutes time today on all issues related to this patient on today date of service including non face to face time.  His PSA is 0.63.

## 2024-05-04 PROBLEM — I35.0 AORTIC VALVE STENOSIS: Status: ACTIVE | Noted: 2024-05-04

## 2024-05-07 ENCOUNTER — OUTPATIENT (OUTPATIENT)
Dept: OUTPATIENT SERVICES | Facility: HOSPITAL | Age: 74
LOS: 1 days | End: 2024-05-07
Payer: MEDICARE

## 2024-05-07 ENCOUNTER — APPOINTMENT (OUTPATIENT)
Dept: CARDIOTHORACIC SURGERY | Facility: CLINIC | Age: 74
End: 2024-05-07
Payer: MEDICARE

## 2024-05-07 ENCOUNTER — NON-APPOINTMENT (OUTPATIENT)
Age: 74
End: 2024-05-07

## 2024-05-07 ENCOUNTER — RESULT REVIEW (OUTPATIENT)
Age: 74
End: 2024-05-07

## 2024-05-07 VITALS
WEIGHT: 190 LBS | SYSTOLIC BLOOD PRESSURE: 145 MMHG | RESPIRATION RATE: 18 BRPM | DIASTOLIC BLOOD PRESSURE: 63 MMHG | BODY MASS INDEX: 25.73 KG/M2 | OXYGEN SATURATION: 99 % | HEART RATE: 57 BPM | HEIGHT: 72 IN

## 2024-05-07 DIAGNOSIS — I25.118 ATHEROSCLEROTIC HEART DISEASE OF NATIVE CORONARY ARTERY WITH OTHER FORMS OF ANGINA PECTORIS: ICD-10-CM

## 2024-05-07 DIAGNOSIS — I35.0 NONRHEUMATIC AORTIC (VALVE) STENOSIS: ICD-10-CM

## 2024-05-07 PROCEDURE — 93306 TTE W/DOPPLER COMPLETE: CPT

## 2024-05-07 PROCEDURE — 93356 MYOCRD STRAIN IMG SPCKL TRCK: CPT

## 2024-05-07 PROCEDURE — 93306 TTE W/DOPPLER COMPLETE: CPT | Mod: 26

## 2024-05-07 PROCEDURE — 99204 OFFICE O/P NEW MOD 45 MIN: CPT

## 2024-05-07 RX ORDER — ASPIRIN 81 MG/1
81 TABLET, FILM COATED ORAL DAILY
Refills: 0 | Status: ACTIVE | COMMUNITY

## 2024-05-07 RX ORDER — TESTOSTERONE CYPIONATE 200 MG/ML
200 INJECTION, SOLUTION INTRAMUSCULAR
Qty: 9 | Refills: 0 | Status: DISCONTINUED | COMMUNITY
Start: 2022-05-23 | End: 2024-05-07

## 2024-05-07 RX ORDER — ALPRAZOLAM 0.25 MG/1
0.25 TABLET ORAL
Qty: 10 | Refills: 0 | Status: DISCONTINUED | COMMUNITY
Start: 2022-05-10 | End: 2024-05-07

## 2024-05-07 RX ORDER — CLOPIDOGREL BISULFATE 75 MG/1
75 TABLET, FILM COATED ORAL
Qty: 90 | Refills: 3 | Status: ACTIVE | COMMUNITY

## 2024-05-07 NOTE — HISTORY OF PRESENT ILLNESS
[FreeTextEntry1] : Mr. FRAZIER is a 74 year old male with past medical history of DARIUS (Cr 1.3), CAD, GERD, Prostate cancer, (Silodosin) and aortic valve stenosis.   He is referred by Dr. Cristiano Osei and Dr. Muniz, for initial evaluation and management for Aortic stenosis.   He was admitted to [facility] (1/27/204 TO 1/28/2024) with chest pain. Patient reports that he has chronic chest pain, however this has been worsening prior to admission. Diagnostic cath showed 95% stenosis of mRCA and patient underwent PCI (+ dual antiplatelet therapy). He was evaluated by Dr. Saha while inpatient, his AS was in the moderate range at that time. He was discharged with instructions to follow up in valve clinic in three months with repeat echocardiogram for reassessment of aortic stenosis.    Since discharge he reports that prior to PCI he was having intermittent chest discomfort that he described as "explosions" that has resolved since intervention. He now reports chest heaviness that is independent of activity and can lasts seconds to "hours". He is sedentary at baseline "spending 20 hours per day in bed" due to chronic neck pain. He has no dyspnea, PND, orthopnea, or edema.   NYHA II  Repeat echocardiogram today demonstrates a trileaflet with reduced systolic excursion.  There is calcification of the aortic valve leaflets.  There is severe aortic stenosis.  The peak transaortic velocity is 4.16 m/s, peak transaortic gradient is 69.3 mmHg and mean transaortic gradient is 40.0 mmHg with an LVOT/aortic valve VTI ratio of 0.21. The aortic valve acceleration time is 155 msec. The aortic valve area is estimated at 0.75 cm by the continuity equation.  There is mild to moderate aortic regurgitation.

## 2024-05-07 NOTE — REVIEW OF SYSTEMS
[Chest Pain] : chest pain [As Noted in HPI] : as noted in HPI [Negative] : Heme/Lymph [Fever] : no fever [Chills] : no chills [Lower Ext Edema] : no extremity edema [Shortness Of Breath] : no shortness of breath [Abdominal Pain] : no abdominal pain [Vomiting] : no vomiting [Dizziness] : no dizziness

## 2024-05-07 NOTE — PHYSICAL EXAM
[General Appearance - Alert] : alert [Sclera] : the sclera and conjunctiva were normal [Outer Ear] : the ears and nose were normal in appearance [Oropharynx] : the oropharynx was normal [Neck Appearance] : the appearance of the neck was normal [Neck Cervical Mass (___cm)] : no neck mass was observed [Jugular Venous Distention Increased] : there was no jugular-venous distention [Thyroid Diffuse Enlargement] : the thyroid was not enlarged [] : no respiratory distress [Thyroid Nodule] : there were no palpable thyroid nodules [Respiration, Rhythm And Depth] : normal respiratory rhythm and effort [Heart Sounds] : normal S1 and S2 [Systolic grade ___/6] : A grade [unfilled]/6 systolic murmur was heard. [Examination Of The Chest] : the chest was normal in appearance [Bowel Sounds] : normal bowel sounds [Abdomen Soft] : soft [Abdomen Tenderness] : non-tender [Abnormal Walk] : normal gait [Skin Turgor] : normal skin turgor [Oriented To Time, Place, And Person] : oriented to person, place, and time [Right Carotid Bruit] : no bruit heard over the right carotid [Left Carotid Bruit] : no bruit heard over the left carotid [Fingers] :  capillary refill of the fingers was normal

## 2024-05-07 NOTE — END OF VISIT
[FreeTextEntry3] : I, Dr. Dickerson, personally performed the evaluation and management (E/M) services for this new patient.  That E/M includes conducting the initial examination, assessing all conditions, and establishing the plan of care.  Today, Reva Moore NP was here to observe my evaluation and management services for this patient to be followed going forward.

## 2024-05-07 NOTE — ASSESSMENT
[FreeTextEntry1] : Mr. FRAZIER is a 74 year old male with past medical history of DARIUS (1.3), CAD (s/p PCI 1/2024), GERD, Prostate cancer, and aortic valve disease.   He is referred by Dr. Cristiano Osei and Dr. Nelson Muniz, for initial evaluation and management for Aortic stenosis.  He has persistent angina following PCI with progression of AS to severe on repeat echocardiogram today Aortic valve done today reviewed with Dr. Doshi.  DVI .21 PG69 MG40  WHITNEY .75.  He has symptomatic Sever AS with MARS while walkjing eventhough he is severely limited by neck and back issues requiring Fenanyl Patchs.     He  is a low risk for surgical AVR with a calculated STS score of 1.7 % However he is a poor surgical candiadate because of his neck and back issues would make recovery from SAVR very difficult for the patient.   Plan: 1) Lab work scheduled for today CBC, CMP and Pro BNP 2) Structural CT scan without coronaries 3) Schedule TAVR

## 2024-05-07 NOTE — DATA REVIEWED
[FreeTextEntry1] : s/p diagnostic LHC via RRA access mRCA staged 95% 1/29  then PCI HAROON x 1 p/m RCA 95% via RRA access on 1/30.  1/28/24 TTE revealed   Left ventricular cavity is normal. Left ventricular wall thickness is normal. Left ventricular systolic function is normal with an ejection fraction of 55 % by 3D. There are no regional wall motion abnormalities seen.  2. Normal right ventricular cavity size and normal systolic function.  3. Structurally normal mitral valve with normal leaflet excursion. There is calcification of the mitral valve annulus. There is trace mitral regurgitation.  4. The aortic valve anatomy cannot be determined with reduced systolic excursion. There is calcification of the aortic valve leaflets. There is moderate aortic stenosis. The peak transaortic velocity is 3.45 m/s, peak transaortic gradient is 47.6 mmHg and mean transaortic gradient is 26.0 mmHg with an LVOT/aortic valve VTI ratio of 0.35. The aortic valve area is estimated at 1.21 cm by the continuity equation. There is mild to moderate aortic regurgitation.  5. No prior echocardiogram is available for comparison.  4/10/23 TTE revealed  EF 53%, Aortic valve calcified with decreased opening, mild AI.  Severe AS. PGR 54.74 mm hg, MGR 44.36 mm hg, WHITNEY 0.73 sqcm. Mild MR, MAC, Mild TR/Trace PI.

## 2024-05-24 ENCOUNTER — APPOINTMENT (OUTPATIENT)
Dept: CARDIOLOGY | Facility: CLINIC | Age: 74
End: 2024-05-24

## 2024-05-24 ENCOUNTER — OUTPATIENT (OUTPATIENT)
Dept: OUTPATIENT SERVICES | Facility: HOSPITAL | Age: 74
LOS: 1 days | End: 2024-05-24
Payer: MEDICARE

## 2024-05-24 VITALS
RESPIRATION RATE: 18 BRPM | DIASTOLIC BLOOD PRESSURE: 82 MMHG | HEART RATE: 59 BPM | SYSTOLIC BLOOD PRESSURE: 122 MMHG | HEIGHT: 72 IN | TEMPERATURE: 98 F | WEIGHT: 192.02 LBS | OXYGEN SATURATION: 97 %

## 2024-05-24 DIAGNOSIS — Z98.890 OTHER SPECIFIED POSTPROCEDURAL STATES: Chronic | ICD-10-CM

## 2024-05-24 DIAGNOSIS — I35.0 NONRHEUMATIC AORTIC (VALVE) STENOSIS: ICD-10-CM

## 2024-05-24 DIAGNOSIS — Z01.818 ENCOUNTER FOR OTHER PREPROCEDURAL EXAMINATION: ICD-10-CM

## 2024-05-24 DIAGNOSIS — Z90.49 ACQUIRED ABSENCE OF OTHER SPECIFIED PARTS OF DIGESTIVE TRACT: Chronic | ICD-10-CM

## 2024-05-24 DIAGNOSIS — G47.33 OBSTRUCTIVE SLEEP APNEA (ADULT) (PEDIATRIC): ICD-10-CM

## 2024-05-24 DIAGNOSIS — Z29.9 ENCOUNTER FOR PROPHYLACTIC MEASURES, UNSPECIFIED: ICD-10-CM

## 2024-05-24 LAB
A1C WITH ESTIMATED AVERAGE GLUCOSE RESULT: 5.7 % — HIGH (ref 4–5.6)
ALBUMIN SERPL ELPH-MCNC: 4.9 G/DL — SIGNIFICANT CHANGE UP (ref 3.3–5)
ALP SERPL-CCNC: 70 U/L — SIGNIFICANT CHANGE UP (ref 40–120)
ALT FLD-CCNC: 25 U/L — SIGNIFICANT CHANGE UP (ref 10–45)
ANION GAP SERPL CALC-SCNC: 11 MMOL/L — SIGNIFICANT CHANGE UP (ref 5–17)
AST SERPL-CCNC: 28 U/L — SIGNIFICANT CHANGE UP (ref 10–40)
BILIRUB SERPL-MCNC: 0.8 MG/DL — SIGNIFICANT CHANGE UP (ref 0.2–1.2)
BLD GP AB SCN SERPL QL: NEGATIVE — SIGNIFICANT CHANGE UP
BUN SERPL-MCNC: 20 MG/DL — SIGNIFICANT CHANGE UP (ref 7–23)
CALCIUM SERPL-MCNC: 10.1 MG/DL — SIGNIFICANT CHANGE UP (ref 8.4–10.5)
CHLORIDE SERPL-SCNC: 102 MMOL/L — SIGNIFICANT CHANGE UP (ref 96–108)
CO2 SERPL-SCNC: 28 MMOL/L — SIGNIFICANT CHANGE UP (ref 22–31)
CREAT SERPL-MCNC: 1.37 MG/DL — HIGH (ref 0.5–1.3)
EGFR: 53 ML/MIN/1.73M2 — LOW
ESTIMATED AVERAGE GLUCOSE: 117 MG/DL — HIGH (ref 68–114)
GLUCOSE SERPL-MCNC: 115 MG/DL — HIGH (ref 70–99)
HCT VFR BLD CALC: 41.4 % — SIGNIFICANT CHANGE UP (ref 39–50)
HGB BLD-MCNC: 13.8 G/DL — SIGNIFICANT CHANGE UP (ref 13–17)
MCHC RBC-ENTMCNC: 28.2 PG — SIGNIFICANT CHANGE UP (ref 27–34)
MCHC RBC-ENTMCNC: 33.3 GM/DL — SIGNIFICANT CHANGE UP (ref 32–36)
MCV RBC AUTO: 84.5 FL — SIGNIFICANT CHANGE UP (ref 80–100)
MRSA PCR RESULT.: SIGNIFICANT CHANGE UP
NRBC # BLD: 0 /100 WBCS — SIGNIFICANT CHANGE UP (ref 0–0)
NT-PROBNP SERPL-SCNC: 224 PG/ML — SIGNIFICANT CHANGE UP (ref 0–300)
PLATELET # BLD AUTO: 168 K/UL — SIGNIFICANT CHANGE UP (ref 150–400)
POTASSIUM SERPL-MCNC: 4.5 MMOL/L — SIGNIFICANT CHANGE UP (ref 3.5–5.3)
POTASSIUM SERPL-SCNC: 4.5 MMOL/L — SIGNIFICANT CHANGE UP (ref 3.5–5.3)
PROT SERPL-MCNC: 7.8 G/DL — SIGNIFICANT CHANGE UP (ref 6–8.3)
RBC # BLD: 4.9 M/UL — SIGNIFICANT CHANGE UP (ref 4.2–5.8)
RBC # FLD: 12.2 % — SIGNIFICANT CHANGE UP (ref 10.3–14.5)
RH IG SCN BLD-IMP: POSITIVE — SIGNIFICANT CHANGE UP
S AUREUS DNA NOSE QL NAA+PROBE: SIGNIFICANT CHANGE UP
SODIUM SERPL-SCNC: 141 MMOL/L — SIGNIFICANT CHANGE UP (ref 135–145)
WBC # BLD: 8.65 K/UL — SIGNIFICANT CHANGE UP (ref 3.8–10.5)
WBC # FLD AUTO: 8.65 K/UL — SIGNIFICANT CHANGE UP (ref 3.8–10.5)

## 2024-05-24 PROCEDURE — 86901 BLOOD TYPING SEROLOGIC RH(D): CPT

## 2024-05-24 PROCEDURE — 83036 HEMOGLOBIN GLYCOSYLATED A1C: CPT

## 2024-05-24 PROCEDURE — 74174 CTA ABD&PLVS W/CONTRAST: CPT

## 2024-05-24 PROCEDURE — 75574 CT ANGIO HRT W/3D IMAGE: CPT

## 2024-05-24 PROCEDURE — 86900 BLOOD TYPING SEROLOGIC ABO: CPT

## 2024-05-24 PROCEDURE — 83880 ASSAY OF NATRIURETIC PEPTIDE: CPT

## 2024-05-24 PROCEDURE — 86850 RBC ANTIBODY SCREEN: CPT

## 2024-05-24 PROCEDURE — 80053 COMPREHEN METABOLIC PANEL: CPT

## 2024-05-24 PROCEDURE — 85027 COMPLETE CBC AUTOMATED: CPT

## 2024-05-24 PROCEDURE — 87641 MR-STAPH DNA AMP PROBE: CPT

## 2024-05-24 PROCEDURE — 87640 STAPH A DNA AMP PROBE: CPT

## 2024-05-24 PROCEDURE — G0463: CPT

## 2024-05-24 PROCEDURE — 71275 CT ANGIOGRAPHY CHEST: CPT

## 2024-05-24 RX ORDER — CEFUROXIME AXETIL 250 MG
1500 TABLET ORAL ONCE
Refills: 0 | Status: DISCONTINUED | OUTPATIENT
Start: 2024-05-29 | End: 2024-05-30

## 2024-05-24 RX ORDER — CHLORHEXIDINE GLUCONATE 213 G/1000ML
1 SOLUTION TOPICAL ONCE
Refills: 0 | Status: DISCONTINUED | OUTPATIENT
Start: 2024-05-29 | End: 2024-05-29

## 2024-05-24 RX ORDER — ESOMEPRAZOLE MAGNESIUM 40 MG/1
1 CAPSULE, DELAYED RELEASE ORAL
Refills: 0 | DISCHARGE

## 2024-05-24 RX ORDER — LIDOCAINE HCL 20 MG/ML
0.2 VIAL (ML) INJECTION ONCE
Refills: 0 | Status: DISCONTINUED | OUTPATIENT
Start: 2024-05-29 | End: 2024-05-30

## 2024-05-24 RX ORDER — SODIUM CHLORIDE 9 MG/ML
3 INJECTION INTRAMUSCULAR; INTRAVENOUS; SUBCUTANEOUS EVERY 8 HOURS
Refills: 0 | Status: DISCONTINUED | OUTPATIENT
Start: 2024-05-29 | End: 2024-05-29

## 2024-05-24 NOTE — H&P PST ADULT - NSICDXPASTSURGICALHX_GEN_ALL_CORE_FT
PAST SURGICAL HISTORY:  H/O hernia repair     H/O nasal septoplasty     H/O neck surgery     History of cardiac cath     History of prostate surgery     Previous back surgery     S/P cholecystectomy

## 2024-05-24 NOTE — H&P PST ADULT - NSANTHOSAYNRD_GEN_A_CORE
No. SHANNEN screening performed.  STOP BANG Legend: 0-2 = LOW Risk; 3-4 = INTERMEDIATE Risk; 5-8 = HIGH Risk

## 2024-05-24 NOTE — H&P PST ADULT - SKIN
warm and dry/color normal/no rashes/no ulcers/no subcutaneous nodules warm and dry/pale/no rashes/no ulcers/no subcutaneous nodules

## 2024-05-24 NOTE — H&P PST ADULT - HISTORY OF PRESENT ILLNESS
74 year old male presents to [facility] prior to TAVR on 5/29/24 with Dr Dickerson. Patient recently admitted to [facility] (1/27/204 TO 1/28/2024)  underwent diagnostic cath, which showed 95% stenosis of mRCA and patient underwent PCI - one stent placement (+ dual antiplatelet therapy), AS was in the moderate range at that time. States his pain in his chest has increased and has noticed fatigue when walking intermittently. In January echo revealed moderate AS, most recently states his last echo showed "severe AS".  States he is under a tremendous amount of stress r/t this surgery and cancer diagnosis. PMHx includes DARIUS (Cr 1.3), CAD, GERD, Prostate cancer (takes Silodosin), and aortic valve stenosis, and HLD. Patient denies shortness of breath, fevers / chills.    74 year old male presents to [facility] prior to TAVR on 5/29/24 with Dr Dickerson. Patient recently admitted to [facility] (1/27/204 TO 1/28/2024)  underwent diagnostic cath, which showed 95% stenosis of mRCA and patient underwent PCI - one stent placement (+ dual antiplatelet therapy), AS was in the moderate range at that time. States his pain in his chest has increased and has noticed fatigue when walking intermittently. In January echo revealed moderate AS, most recently states his last echo (May 2024) showed "severe AS".  States he is under a tremendous amount of stress r/t this surgery and cancer diagnosis.  Patient denies shortness of breath, fevers / chills.    PMHx includes DARIUS (Cr 1.3), former smoker, CAD, GERD, Prostate cancer (takes Silodosin), and aortic valve stenosis, and HLD.     Patient states he had sleep apnea over 20 years ago, underwent septum surgery (and significant weight loss) and has had intermittent issues swallowing food since. States he was seen by an ENT 6 years ago and told "everything was normal". Discussed issue with anesthesiologist Khurram Vieira MD, no further workup/eval needed.    Patient requested MOLST form prior to surgery. MOLST form and education provided.

## 2024-05-24 NOTE — H&P PST ADULT - PSYCHIATRIC
alert and oriented x3/normal behavior details… high anxiety/alert and oriented x3/normal behavior/anxious

## 2024-05-24 NOTE — H&P PST ADULT - OTHER CARE PROVIDERS
Jayla / Maria A (cardiologist) // Cristiano Carranza (cardiologist) [phone] // Nelson Muniz (interventionalist) [phone] //

## 2024-05-24 NOTE — H&P PST ADULT - NEGATIVE GENERAL GENITOURINARY SYMPTOMS
no hematuria/no renal colic/no flank pain R/no urine discoloration/no gas in urine/no bladder infections/no incontinence/no dysuria/no nocturia/normal libido

## 2024-05-24 NOTE — H&P PST ADULT - NSICDXPASTMEDICALHX_GEN_ALL_CORE_FT
PAST MEDICAL HISTORY:  DARIUS (acute kidney injury)     Aortic stenosis     CAD (coronary artery disease)     GERD (gastroesophageal reflux disease)     HLD (hyperlipidemia)     Prostate CA      PAST MEDICAL HISTORY:  DARIUS (acute kidney injury)     Aortic stenosis     CAD (coronary artery disease)     Former smoker     GERD (gastroesophageal reflux disease)     HLD (hyperlipidemia)     Prostate CA

## 2024-05-24 NOTE — H&P PST ADULT - PRO INTERPRETER NEED 2
Instructions (Optional): Recommended Dr. Carroll / Jose Introduction Text (Please End With A Colon): The following procedure was deferred: Mohs Detail Level: Simple English

## 2024-05-24 NOTE — H&P PST ADULT - MUSCULOSKELETAL
ROM intact/no joint swelling/no joint erythema/no joint warmth/no calf tenderness/normal gait/strength 5/5 bilateral upper extremities/strength 5/5 bilateral lower extremities/no chest wall tenderness details… chronic back/neck pain/ROM intact/no joint swelling/no joint erythema/no joint warmth/no calf tenderness/decreased ROM due to pain/normal gait/strength 5/5 bilateral upper extremities/strength 5/5 bilateral lower extremities/no chest wall tenderness

## 2024-05-24 NOTE — H&P PST ADULT - PROBLEM SELECTOR PLAN 1
TAVR  CBC CMP T&S MRSA BNP HA1C in PST   Soap provided and instructions given in writing  Diet provided and instructions given in writing   All questions answered during exam. MOLST requested and provided     Patient to continue aspirin and plavix

## 2024-05-24 NOTE — H&P PST ADULT - ASSESSMENT
Airway:  normal    Mallampati-       Dental: Patient denies loose teeth    4-10 METS  4.95 walking on own, up and down stairs, basic ADLs   Airway:  normal    Mallampati-     3  Dental: Patient denies loose teeth    4-10 METS  4.95 walking on own, up and down stairs, basic ADLs    CAPRINI VTE 2.0 SCORE [CLOT updated 2019]    AGE RELATED RISK FACTORS                                                       MOBILITY RELATED FACTORS  [ ] Age 41-60 years                                            (1 Point)                    [ ] Bed rest                                                        (1 Point)  [ ] Age: 61-74 years                                           (2 Points)                  [ ] Plaster cast                                                   (2 Points)  [x ] Age= 75 years                                              (3 Points)                    [ ] Bed bound for more than 72 hours                 (2 Points)    DISEASE RELATED RISK FACTORS                                               GENDER SPECIFIC FACTORS  [ ] Edema in the lower extremities                       (1 Point)              [ ] Pregnancy                                                     (1 Point)  [ ] Varicose veins                                               (1 Point)                     [ ] Post-partum < 6 weeks                                   (1 Point)             [x ] BMI > 25 Kg/m2                                            (1 Point)                     [ ] Hormonal therapy  or oral contraception          (1 Point)                 [ ] Sepsis (in the previous month)                        (1 Point)               [ ] History of pregnancy complications                 (1 point)  [ ] Pneumonia or serious lung disease                                               [ ] Unexplained or recurrent                     (1 Point)           (in the previous month)                               (1 Point)  [ ] Abnormal pulmonary function test                     (1 Point)                 SURGERY RELATED RISK FACTORS  [ ] Acute myocardial infarction                              (1 Point)               [ ]  Section                                             (1 Point)  [ ] Congestive heart failure (in the previous month)  (1 Point)      [ ] Minor surgery                                                  (1 Point)   [ ] Inflammatory bowel disease                             (1 Point)               [ ] Arthroscopic surgery                                        (2 Points)  [ ] Central venous access                                      (2 Points)                [x ] General surgery lasting more than 45 minutes (2 points)  [ x] Malignancy- Present or previous                   (2 Points)                [ ] Elective arthroplasty                                         (5 points)    [ ] Stroke (in the previous month)                          (5 Points)                                                                                                                                                           HEMATOLOGY RELATED FACTORS                                                 TRAUMA RELATED RISK FACTORS  [ ] Prior episodes of VTE                                     (3 Points)                [ ] Fracture of the hip, pelvis, or leg                       (5 Points)  [ ] Positive family history for VTE                         (3 Points)             [ ] Acute spinal cord injury (in the previous month)  (5 Points)  [ ] Prothrombin 56717 A                                     (3 Points)               [ ] Paralysis  (less than 1 month)                             (5 Points)  [ ] Factor V Leiden                                             (3 Points)                  [ ] Multiple Trauma within 1 month                        (5 Points)  [ ] Lupus anticoagulants                                     (3 Points)                                                           [ ] Anticardiolipin antibodies                               (3 Points)                                                       [ ] High homocysteine in the blood                      (3 Points)                                             [ ] Other congenital or acquired thrombophilia      (3 Points)                                                [ ] Heparin induced thrombocytopenia                  (3 Points)                                     Total Score [    8      ]

## 2024-05-24 NOTE — H&P PST ADULT - NEGATIVE ENMT SYMPTOMS
no ear pain/no nose bleeds/no recurrent cold sores/no abnormal taste sensation/no gum bleeding/no throat pain

## 2024-05-28 ENCOUNTER — TRANSCRIPTION ENCOUNTER (OUTPATIENT)
Age: 74
End: 2024-05-28

## 2024-05-29 ENCOUNTER — INPATIENT (INPATIENT)
Facility: HOSPITAL | Age: 74
LOS: 0 days | Discharge: HOME HEALTH | DRG: 307 | End: 2024-05-30
Attending: THORACIC SURGERY (CARDIOTHORACIC VASCULAR SURGERY) | Admitting: THORACIC SURGERY (CARDIOTHORACIC VASCULAR SURGERY)
Payer: MEDICARE

## 2024-05-29 ENCOUNTER — RESULT REVIEW (OUTPATIENT)
Age: 74
End: 2024-05-29

## 2024-05-29 ENCOUNTER — APPOINTMENT (OUTPATIENT)
Dept: CARDIOTHORACIC SURGERY | Facility: HOSPITAL | Age: 74
End: 2024-05-29

## 2024-05-29 VITALS
HEIGHT: 72.01 IN | SYSTOLIC BLOOD PRESSURE: 129 MMHG | DIASTOLIC BLOOD PRESSURE: 76 MMHG | HEART RATE: 76 BPM | WEIGHT: 196.87 LBS | RESPIRATION RATE: 16 BRPM | OXYGEN SATURATION: 99 % | TEMPERATURE: 98 F

## 2024-05-29 DIAGNOSIS — Z98.890 OTHER SPECIFIED POSTPROCEDURAL STATES: Chronic | ICD-10-CM

## 2024-05-29 DIAGNOSIS — M54.9 DORSALGIA, UNSPECIFIED: ICD-10-CM

## 2024-05-29 DIAGNOSIS — I35.0 NONRHEUMATIC AORTIC (VALVE) STENOSIS: ICD-10-CM

## 2024-05-29 DIAGNOSIS — Z90.49 ACQUIRED ABSENCE OF OTHER SPECIFIED PARTS OF DIGESTIVE TRACT: Chronic | ICD-10-CM

## 2024-05-29 DIAGNOSIS — Z95.3 PRESENCE OF XENOGENIC HEART VALVE: ICD-10-CM

## 2024-05-29 PROBLEM — I25.10 ATHEROSCLEROTIC HEART DISEASE OF NATIVE CORONARY ARTERY WITHOUT ANGINA PECTORIS: Chronic | Status: ACTIVE | Noted: 2024-05-24

## 2024-05-29 PROBLEM — Z87.891 PERSONAL HISTORY OF NICOTINE DEPENDENCE: Chronic | Status: ACTIVE | Noted: 2024-05-24

## 2024-05-29 PROBLEM — N17.9 ACUTE KIDNEY FAILURE, UNSPECIFIED: Chronic | Status: ACTIVE | Noted: 2024-05-24

## 2024-05-29 PROBLEM — E78.5 HYPERLIPIDEMIA, UNSPECIFIED: Chronic | Status: ACTIVE | Noted: 2024-05-24

## 2024-05-29 LAB
GLUCOSE BLDC GLUCOMTR-MCNC: 127 MG/DL — HIGH (ref 70–99)
RH IG SCN BLD-IMP: POSITIVE — SIGNIFICANT CHANGE UP

## 2024-05-29 PROCEDURE — 93306 TTE W/DOPPLER COMPLETE: CPT | Mod: 26

## 2024-05-29 PROCEDURE — 93010 ELECTROCARDIOGRAM REPORT: CPT

## 2024-05-29 PROCEDURE — 33361 REPLACE AORTIC VALVE PERQ: CPT | Mod: 62,Q0

## 2024-05-29 DEVICE — CATH IMPULSE FR4 5F X 100CM: Type: IMPLANTABLE DEVICE | Status: FUNCTIONAL

## 2024-05-29 DEVICE — VLV TRANS CATH W/COMM SYS SAPIEN 3 ULTRA 26MM: Type: IMPLANTABLE DEVICE | Status: FUNCTIONAL

## 2024-05-29 DEVICE — PACING CATH PACEL RIGHT HEART CURVE 5FR: Type: IMPLANTABLE DEVICE | Status: FUNCTIONAL

## 2024-05-29 DEVICE — GWIRE FIXED CORE PTFE .035 X 150CM: Type: IMPLANTABLE DEVICE | Status: FUNCTIONAL

## 2024-05-29 DEVICE — WIRE GD SAFARI2 275CM SML CRV BX/5: Type: IMPLANTABLE DEVICE | Status: FUNCTIONAL

## 2024-05-29 DEVICE — SHEATH INTRODUCER TERUMO PINNACLE CORONARY 8FR X 10CM X 0.038" MINI WIRE: Type: IMPLANTABLE DEVICE | Status: FUNCTIONAL

## 2024-05-29 DEVICE — GWIRE STR .038X180 STIFF LONG TAPR: Type: IMPLANTABLE DEVICE | Status: FUNCTIONAL

## 2024-05-29 DEVICE — CATH IMPULSE PIG 145 5FR X 110CM: Type: IMPLANTABLE DEVICE | Status: FUNCTIONAL

## 2024-05-29 DEVICE — ANGIOSEAL VASC CLOS VIP 6FR: Type: IMPLANTABLE DEVICE | Status: FUNCTIONAL

## 2024-05-29 DEVICE — SHEATH INTRODUCER TERUMO PINNACLE CORONARY 6FR X 10CM X 0.038" MINI WIRE: Type: IMPLANTABLE DEVICE | Status: FUNCTIONAL

## 2024-05-29 DEVICE — GUIDEWIRE AMPLATZ EXTRA-STIFF CURVED .035" X 260CM: Type: IMPLANTABLE DEVICE | Status: FUNCTIONAL

## 2024-05-29 DEVICE — SUT PERCLOSE PROGLIDE 6FR: Type: IMPLANTABLE DEVICE | Status: FUNCTIONAL

## 2024-05-29 DEVICE — CATH DIAG DXTERITY PIGTAIL 5F 110CM PG145: Type: IMPLANTABLE DEVICE | Status: FUNCTIONAL

## 2024-05-29 DEVICE — GWIRE INQWIRE JTIP .035X260MM: Type: IMPLANTABLE DEVICE | Status: FUNCTIONAL

## 2024-05-29 DEVICE — CATH DIAG DXTERITY AMPLATZ LEFT 5F 100CM AL10: Type: IMPLANTABLE DEVICE | Status: FUNCTIONAL

## 2024-05-29 RX ORDER — HYDROMORPHONE HYDROCHLORIDE 2 MG/ML
0.5 INJECTION INTRAMUSCULAR; INTRAVENOUS; SUBCUTANEOUS ONCE
Refills: 0 | Status: DISCONTINUED | OUTPATIENT
Start: 2024-05-29 | End: 2024-05-29

## 2024-05-29 RX ORDER — TAMSULOSIN HYDROCHLORIDE 0.4 MG/1
0.4 CAPSULE ORAL AT BEDTIME
Refills: 0 | Status: DISCONTINUED | OUTPATIENT
Start: 2024-05-29 | End: 2024-05-30

## 2024-05-29 RX ORDER — CEFUROXIME AXETIL 250 MG
1500 TABLET ORAL EVERY 8 HOURS
Refills: 0 | Status: DISCONTINUED | OUTPATIENT
Start: 2024-05-29 | End: 2024-05-29

## 2024-05-29 RX ORDER — ASPIRIN/CALCIUM CARB/MAGNESIUM 324 MG
81 TABLET ORAL DAILY
Refills: 0 | Status: DISCONTINUED | OUTPATIENT
Start: 2024-05-29 | End: 2024-05-30

## 2024-05-29 RX ORDER — CEFUROXIME AXETIL 250 MG
1500 TABLET ORAL EVERY 8 HOURS
Refills: 0 | Status: COMPLETED | OUTPATIENT
Start: 2024-05-29 | End: 2024-05-30

## 2024-05-29 RX ORDER — PANTOPRAZOLE SODIUM 20 MG/1
1 TABLET, DELAYED RELEASE ORAL
Refills: 0 | DISCHARGE

## 2024-05-29 RX ORDER — PANTOPRAZOLE SODIUM 20 MG/1
40 TABLET, DELAYED RELEASE ORAL
Refills: 0 | Status: DISCONTINUED | OUTPATIENT
Start: 2024-05-29 | End: 2024-05-30

## 2024-05-29 RX ORDER — LIDOCAINE 4 G/100G
1 CREAM TOPICAL EVERY 24 HOURS
Refills: 0 | Status: DISCONTINUED | OUTPATIENT
Start: 2024-05-29 | End: 2024-05-30

## 2024-05-29 RX ORDER — FENTANYL CITRATE 50 UG/ML
1 INJECTION INTRAVENOUS
Refills: 0 | DISCHARGE

## 2024-05-29 RX ORDER — LIDOCAINE 4 G/100G
1 CREAM TOPICAL
Refills: 0 | DISCHARGE

## 2024-05-29 RX ORDER — INCLISIRAN 284 MG/1.5ML
284 INJECTION, SOLUTION SUBCUTANEOUS
Refills: 0 | DISCHARGE

## 2024-05-29 RX ORDER — SILODOSIN 4 MG/1
1 CAPSULE ORAL
Refills: 0 | DISCHARGE

## 2024-05-29 RX ORDER — CLOPIDOGREL BISULFATE 75 MG/1
75 TABLET, FILM COATED ORAL DAILY
Refills: 0 | Status: DISCONTINUED | OUTPATIENT
Start: 2024-05-29 | End: 2024-05-30

## 2024-05-29 RX ADMIN — TAMSULOSIN HYDROCHLORIDE 0.4 MILLIGRAM(S): 0.4 CAPSULE ORAL at 23:11

## 2024-05-29 RX ADMIN — CLOPIDOGREL BISULFATE 75 MILLIGRAM(S): 75 TABLET, FILM COATED ORAL at 23:12

## 2024-05-29 RX ADMIN — Medication 100 MILLIGRAM(S): at 23:11

## 2024-05-29 RX ADMIN — HYDROMORPHONE HYDROCHLORIDE 0.5 MILLIGRAM(S): 2 INJECTION INTRAMUSCULAR; INTRAVENOUS; SUBCUTANEOUS at 19:52

## 2024-05-29 RX ADMIN — HYDROMORPHONE HYDROCHLORIDE 0.5 MILLIGRAM(S): 2 INJECTION INTRAMUSCULAR; INTRAVENOUS; SUBCUTANEOUS at 20:57

## 2024-05-29 RX ADMIN — HYDROMORPHONE HYDROCHLORIDE 0.5 MILLIGRAM(S): 2 INJECTION INTRAMUSCULAR; INTRAVENOUS; SUBCUTANEOUS at 18:52

## 2024-05-29 RX ADMIN — HYDROMORPHONE HYDROCHLORIDE 0.5 MILLIGRAM(S): 2 INJECTION INTRAMUSCULAR; INTRAVENOUS; SUBCUTANEOUS at 18:37

## 2024-05-29 RX ADMIN — Medication 81 MILLIGRAM(S): at 23:12

## 2024-05-29 NOTE — PROGRESS NOTE ADULT - PROBLEM SELECTOR PLAN 1
Continue with ASA 81 mg PO Daily.   Structural Team following   Resume Fenofibrate 145 mg PO daily   Resume Zocor 10 mg PO HS   Increase activity as tolerated.   Encourage Chest PT / Pulmonary toileting and Incentive spirometry every 1 hour x 10 while awake.   Continue with Pepcid 20 mg PO daily for PUD   TTE in AM   Daily EKG   D/C plan home in AM if medically cleared   Plan of care discussed with attending Continue with ASA 81 mg PO Daily.   Structural Team following   Increase activity as tolerated.   Encourage Chest PT / Pulmonary toileting and Incentive spirometry every 1 hour x 10 while awake.   Continue with Protonix mg PO daily for PUD   TTE in AM   Daily EKG   D/C plan home in AM if medically cleared with [facility]   Plan of care discussed with attending

## 2024-05-29 NOTE — BRIEF OPERATIVE NOTE - NSICDXBRIEFPROCEDURE_GEN_ALL_CORE_FT
PROCEDURES:  Percutaneous transcatheter aortic valve replacement (TAVR) by femoral artery approach 29-May-2024 17:12:47  Perlita Cameron

## 2024-05-29 NOTE — PROGRESS NOTE ADULT - ASSESSMENT
74 year old male presents to [facility] prior to TAVR on 5/29/24 with Dr Dickerson. Patient recently admitted to [facility] (1/27/204 TO 1/28/2024) underwent diagnostic cath, which showed 95% stenosis of mRCA and patient underwent PCI - one stent placement (+ dual antiplatelet therapy), AS was in the moderate range at that time. States his pain in his chest has increased and has noticed fatigue when walking intermittently. In January echo revealed moderate AS, most recently states his last echo (May 2024) showed "severe AS".  States he is under a tremendous amount of stress r/t this surgery and cancer diagnosis.  Patient denies shortness of breath, fevers / chills. PMHx includes DARIUS (Cr 1.3), former smoker, CAD, GERD, Prostate cancer (takes Silodosin), and aortic valve stenosis, and HLD.      On 5/29 s/p Percutaneous transcatheter aortic valve replacement 26mm Tay 3 valve  Post op Course:   Unremarkable. Transferred to 2 [facility] Telemetry floor. Patient with c/o worsening chronic back pain.  Dilaudid 0.5 mg IV x 1. Daily EKG. TTE in AM   Disposition: Home in AM with [facility]OT if medically cleared.  74 year old male presents to [facility] prior to TAVR on 5/29/24 with Dr Dickerson. Patient recently admitted to [facility] (1/27/204 TO 1/28/2024) underwent diagnostic cath, which showed 95% stenosis of mRCA and patient underwent PCI - one stent placement (+ dual antiplatelet therapy), AS was in the moderate range at that time. States his pain in his chest has increased and has noticed fatigue when walking intermittently. In January echo revealed moderate AS, most recently states his last echo (May 2024) showed "severe AS".  States he is under a tremendous amount of stress r/t this surgery and cancer diagnosis.  Patient denies shortness of breath, fevers / chills. PMHx includes DARIUS (Cr 1.3), former smoker, CAD, GERD, Prostate cancer (takes Silodosin), and aortic valve stenosis, and HLD.      On 5/29 s/p Percutaneous transcatheter aortic valve replacement 26mm Tay 3 valve via TF approach.  Right groin perclosed / Left Fem angioseal.    Post op Course:   Unremarkable. Pre op and post op RBBB with Bifascicular. Transferred to 2 [facility] Telemetry floor. Patient with c/o worsening chronic back pain.  Dilaudid 0.5 mg IV x 1. Daily EKG. TTE in AM   Disposition: Home in AM with [facility] if medically cleared.

## 2024-05-29 NOTE — PATIENT PROFILE ADULT - FUNCTIONAL SCREEN CURRENT LEVEL: SWALLOWING (IF SCORE 2 OR MORE FOR ANY ITEM, CONSULT REHAB SERVICES), MLM)
2 = difficulty swallowing foods Patient states he feels like there is something in his throat that causes him to have difficulty swallowing at times/2 = difficulty swallowing liquids/foods

## 2024-05-29 NOTE — PROGRESS NOTE ADULT - SUBJECTIVE AND OBJECTIVE BOX
VITAL SIGNS    Subjective: "I'm having bad back pain" Denies CP, palpitation, SOB, MARS, HA, dizziness, N/V/D, fever or chills.  No acute event noted overnight.     Telemetry: NSR 60's       Vital Signs Last 24 Hrs  T(C): 36.7 (05-29-24 @ 18:00), Max: 36.7 (05-29-24 @ 12:18)  T(F): 98 (05-29-24 @ 18:00), Max: 98.1 (05-29-24 @ 12:18)  HR: 67 (05-29-24 @ 18:00) (67 - 100)  BP: 145/75 (05-29-24 @ 18:00) (115/67 - 145/75)  RR: 18 (05-29-24 @ 18:00) (16 - 19)  SpO2: 97% (05-29-24 @ 18:00) (92% - 99%)               Daily Height in cm: 182.9 (29 May 2024 14:46)    Daily       CAPILLARY BLOOD GLUCOSE    POCT Blood Glucose.: 127 mg/dL (29 May 2024 11:36)        PHYSICAL EXAM    Neurology: alert and oriented x 3, nonfocal, no gross deficits    CV: (+) S1 and S2, No murmurs, rubs, gallops or clicks     Lungs: CTA B/L     Abdomen: soft, nontender, nondistended, positive bowel sounds, (+) Flatus; (+) BM     :  Voiding               Extremities:  B/L LE (-) edema; negative calf tenderness; (+) 2 DP palpable; B/L groin sites with DSD C/D/I         aspirin enteric coated 81 milliGRAM(s) Oral daily  cefuroxime  IVPB 1500 milliGRAM(s) IV Intermittent once  cefuroxime  IVPB 1500 milliGRAM(s) IV Intermittent every 8 hours  clopidogrel Tablet 75 milliGRAM(s) Oral daily  lidocaine 1% Injectable 0.2 milliLiter(s) Local Injection once    Physical Therapy Rec:   Home  [ X ]   Home w/ PT  [  ]  Rehab  [  ]    Discussed with Cardiothoracic Team at AM rounds.

## 2024-05-30 ENCOUNTER — TRANSCRIPTION ENCOUNTER (OUTPATIENT)
Age: 74
End: 2024-05-30

## 2024-05-30 ENCOUNTER — RESULT REVIEW (OUTPATIENT)
Age: 74
End: 2024-05-30

## 2024-05-30 VITALS
TEMPERATURE: 98 F | HEART RATE: 69 BPM | SYSTOLIC BLOOD PRESSURE: 113 MMHG | DIASTOLIC BLOOD PRESSURE: 67 MMHG | RESPIRATION RATE: 18 BRPM | OXYGEN SATURATION: 98 %

## 2024-05-30 LAB
ANION GAP SERPL CALC-SCNC: 14 MMOL/L — SIGNIFICANT CHANGE UP (ref 5–17)
BUN SERPL-MCNC: 21 MG/DL — SIGNIFICANT CHANGE UP (ref 7–23)
CALCIUM SERPL-MCNC: 9.5 MG/DL — SIGNIFICANT CHANGE UP (ref 8.4–10.5)
CHLORIDE SERPL-SCNC: 102 MMOL/L — SIGNIFICANT CHANGE UP (ref 96–108)
CO2 SERPL-SCNC: 23 MMOL/L — SIGNIFICANT CHANGE UP (ref 22–31)
CREAT SERPL-MCNC: 1.17 MG/DL — SIGNIFICANT CHANGE UP (ref 0.5–1.3)
EGFR: 65 ML/MIN/1.73M2 — SIGNIFICANT CHANGE UP
GLUCOSE SERPL-MCNC: 182 MG/DL — HIGH (ref 70–99)
HCT VFR BLD CALC: 36.9 % — LOW (ref 39–50)
HGB BLD-MCNC: 12.9 G/DL — LOW (ref 13–17)
MCHC RBC-ENTMCNC: 28.8 PG — SIGNIFICANT CHANGE UP (ref 27–34)
MCHC RBC-ENTMCNC: 35 GM/DL — SIGNIFICANT CHANGE UP (ref 32–36)
MCV RBC AUTO: 82.4 FL — SIGNIFICANT CHANGE UP (ref 80–100)
NRBC # BLD: 0 /100 WBCS — SIGNIFICANT CHANGE UP (ref 0–0)
PLATELET # BLD AUTO: 141 K/UL — LOW (ref 150–400)
POTASSIUM SERPL-MCNC: 4.5 MMOL/L — SIGNIFICANT CHANGE UP (ref 3.5–5.3)
POTASSIUM SERPL-SCNC: 4.5 MMOL/L — SIGNIFICANT CHANGE UP (ref 3.5–5.3)
RBC # BLD: 4.48 M/UL — SIGNIFICANT CHANGE UP (ref 4.2–5.8)
RBC # FLD: 11.9 % — SIGNIFICANT CHANGE UP (ref 10.3–14.5)
SODIUM SERPL-SCNC: 139 MMOL/L — SIGNIFICANT CHANGE UP (ref 135–145)
WBC # BLD: 10.59 K/UL — HIGH (ref 3.8–10.5)
WBC # FLD AUTO: 10.59 K/UL — HIGH (ref 3.8–10.5)

## 2024-05-30 PROCEDURE — 99238 HOSP IP/OBS DSCHRG MGMT 30/<: CPT

## 2024-05-30 PROCEDURE — 93005 ELECTROCARDIOGRAM TRACING: CPT

## 2024-05-30 PROCEDURE — 80048 BASIC METABOLIC PNL TOTAL CA: CPT

## 2024-05-30 PROCEDURE — C1760: CPT

## 2024-05-30 PROCEDURE — 93306 TTE W/DOPPLER COMPLETE: CPT

## 2024-05-30 PROCEDURE — C1889: CPT

## 2024-05-30 PROCEDURE — 85027 COMPLETE CBC AUTOMATED: CPT

## 2024-05-30 PROCEDURE — 36415 COLL VENOUS BLD VENIPUNCTURE: CPT

## 2024-05-30 PROCEDURE — L8699: CPT

## 2024-05-30 PROCEDURE — 76000 FLUOROSCOPY <1 HR PHYS/QHP: CPT

## 2024-05-30 PROCEDURE — 93306 TTE W/DOPPLER COMPLETE: CPT | Mod: 26

## 2024-05-30 PROCEDURE — 82962 GLUCOSE BLOOD TEST: CPT

## 2024-05-30 PROCEDURE — C1769: CPT

## 2024-05-30 PROCEDURE — C1887: CPT

## 2024-05-30 PROCEDURE — C1894: CPT

## 2024-05-30 RX ORDER — HYDROMORPHONE HYDROCHLORIDE 2 MG/ML
0.5 INJECTION INTRAMUSCULAR; INTRAVENOUS; SUBCUTANEOUS ONCE
Refills: 0 | Status: DISCONTINUED | OUTPATIENT
Start: 2024-05-30 | End: 2024-05-30

## 2024-05-30 RX ADMIN — PANTOPRAZOLE SODIUM 40 MILLIGRAM(S): 20 TABLET, DELAYED RELEASE ORAL at 05:25

## 2024-05-30 RX ADMIN — Medication 81 MILLIGRAM(S): at 14:10

## 2024-05-30 RX ADMIN — HYDROMORPHONE HYDROCHLORIDE 0.5 MILLIGRAM(S): 2 INJECTION INTRAMUSCULAR; INTRAVENOUS; SUBCUTANEOUS at 10:20

## 2024-05-30 RX ADMIN — CLOPIDOGREL BISULFATE 75 MILLIGRAM(S): 75 TABLET, FILM COATED ORAL at 14:10

## 2024-05-30 RX ADMIN — HYDROMORPHONE HYDROCHLORIDE 0.5 MILLIGRAM(S): 2 INJECTION INTRAMUSCULAR; INTRAVENOUS; SUBCUTANEOUS at 09:50

## 2024-05-30 RX ADMIN — HYDROMORPHONE HYDROCHLORIDE 0.5 MILLIGRAM(S): 2 INJECTION INTRAMUSCULAR; INTRAVENOUS; SUBCUTANEOUS at 05:25

## 2024-05-30 RX ADMIN — Medication 100 MILLIGRAM(S): at 05:25

## 2024-05-30 NOTE — DISCHARGE NOTE PROVIDER - CARE PROVIDER_API CALL
Keegan Dickerson  Thoracic and Cardiac Surgery  [address] [place] [id number]  Phone: [phone]  Fax: [phone]  Established Patient  Scheduled Appointment: 06/04/2024 08:45 AM

## 2024-05-30 NOTE — DISCHARGE NOTE PROVIDER - NSDCMRMEDTOKEN_GEN_ALL_CORE_FT
aspirin 81 mg oral delayed release tablet: 1 tab(s) orally once a day  clopidogrel 75 mg oral tablet: 1 tab(s) orally once a day  Duragesic-100 transdermal film, extended release: 1 patch transdermally every other day  Leqvio 284 mg/1.5 mL subcutaneous solution: 284 milligram(s) subcutaneously every 6 months  Lidoderm 5% topical film: Apply topically to affected area  pantoprazole 40 mg oral delayed release tablet: 1 tab(s) orally once a day  silodosin 4 mg oral capsule: 1 cap(s) orally once a day (at bedtime)

## 2024-05-30 NOTE — DISCHARGE NOTE PROVIDER - PROVIDER TOKENS
PROVIDER:[TOKEN:[[id number]],SCHEDULEDAPPT:[06/04/2024],SCHEDULEDAPPTTIME:[08:45 AM],ESTABLISHEDPATIENT:[T]]

## 2024-05-30 NOTE — DISCHARGE NOTE PROVIDER - NSDCCPCAREPLAN_GEN_ALL_CORE_FT
PRINCIPAL DISCHARGE DIAGNOSIS  Diagnosis: S/p TAVR (transcatheter aortic valve replacement), bioprosthetic  Assessment and Plan of Treatment: take emds as prescribed   ambulate at home  monitor groins

## 2024-05-30 NOTE — DISCHARGE NOTE PROVIDER - NSDCFUSCHEDAPPT_GEN_ALL_CORE_FT
Keegan Dickerson  [facility]  CTSURG [address]  Scheduled Appointment: 06/04/2024

## 2024-05-30 NOTE — DISCHARGE NOTE PROVIDER - HOSPITAL COURSE
74 year old male presents to [facility] prior to TAVR on 5/29/24 with Dr Dickerson. Patient recently admitted to [facility] (1/27/204 TO 1/28/2024) underwent diagnostic cath, which showed 95% stenosis of mRCA and patient underwent PCI - one stent placement (+ dual antiplatelet therapy), AS was in the moderate range at that time. States his pain in his chest has increased and has noticed fatigue when walking intermittently. In January echo revealed moderate AS, most recently states his last echo (May 2024) showed "severe AS".  States he is under a tremendous amount of stress r/t this surgery and cancer diagnosis.  Patient denies shortness of breath, fevers / chills. PMHx includes DARIUS (Cr 1.3), former smoker, CAD, GERD, Prostate cancer (takes Silodosin), and aortic valve stenosis, and HLD.      On 5/29 s/p Percutaneous transcatheter aortic valve replacement 26mm Tay 3 valve via TF approach.  Right groin perclosed / Left Fem angioseal.    Post op Course:   Unremarkable. Pre op and post op RBBB with Bifascicular. Transferred to 2 [facility] Telemetry floor. Patient with c/o worsening chronic back pain.  Dilaudid 0.5 mg IV x 1. Daily EKG. TTE in AM   5/30 VSS  113/67  98% RA  SB/SR  EKG  SR PVC RBBB L ant fascicular block unchanged from previous.  Stable for home with OT

## 2024-05-30 NOTE — DISCHARGE NOTE NURSING/CASE MANAGEMENT/SOCIAL WORK - PATIENT PORTAL LINK FT
You can access the FollowMyHealth Patient Portal offered by [facility] by registering at the following website: [email or web address]. By joining [employer]’s FollowMyHealth portal, you will also be able to view your health information using other applications (apps) compatible with our system.

## 2024-05-30 NOTE — DISCHARGE NOTE PROVIDER - NSDCPNSUBOBJ_GEN_ALL_CORE
General: WN/WD NAD  Neurology: A&Ox3, nonfocal, PATEL x 4  Respiratory: CTA B/L  CV: RRR, S1S2, no murmur  Abdominal: Soft, NT, ND no palpable mass  MSK: No edema, + peripheral pulses, FROM all 4 extremity  Incisions b/l groins stable soft non tender no ecchymosis    ICU Vital Signs Last 24 Hrs  T(C): 36.4 (30 May 2024 04:20), Max: 36.7 (29 May 2024 12:18)  T(F): 97.5 (30 May 2024 04:20), Max: 98.1 (29 May 2024 12:18)  HR: 69 (30 May 2024 04:20) (66 - 100)  BP: 113/67 (30 May 2024 04:20) (113/67 - 145/75)  BP(mean): 100 (29 May 2024 18:00) (100 - 100)  ABP: --  ABP(mean): --  RR: 18 (30 May 2024 04:20) (16 - 19)  SpO2: 98% (30 May 2024 04:20) (92% - 99%)

## 2024-05-31 ENCOUNTER — NON-APPOINTMENT (OUTPATIENT)
Age: 74
End: 2024-05-31

## 2024-05-31 ENCOUNTER — APPOINTMENT (OUTPATIENT)
Dept: CARE COORDINATION | Facility: HOME HEALTH | Age: 74
End: 2024-05-31

## 2024-05-31 ENCOUNTER — APPOINTMENT (OUTPATIENT)
Dept: CARDIOTHORACIC SURGERY | Facility: CLINIC | Age: 74
End: 2024-05-31
Payer: MEDICARE

## 2024-05-31 VITALS — RESPIRATION RATE: 14 BRPM

## 2024-05-31 VITALS
TEMPERATURE: 207.32 F | HEART RATE: 75 BPM | OXYGEN SATURATION: 99 % | DIASTOLIC BLOOD PRESSURE: 77 MMHG | SYSTOLIC BLOOD PRESSURE: 121 MMHG | RESPIRATION RATE: 16 BRPM

## 2024-05-31 LAB
BASOPHILS # BLD AUTO: 0.01 K/UL
BASOPHILS NFR BLD AUTO: 0.1 %
EOSINOPHIL # BLD AUTO: 0.12 K/UL
EOSINOPHIL NFR BLD AUTO: 1 %
HCT VFR BLD CALC: 40.2 %
HGB BLD-MCNC: 13.5 G/DL
IMM GRANULOCYTES NFR BLD AUTO: 0.7 %
LYMPHOCYTES # BLD AUTO: 1.62 K/UL
LYMPHOCYTES NFR BLD AUTO: 13.6 %
MAN DIFF?: NORMAL
MCHC RBC-ENTMCNC: 28.7 PG
MCHC RBC-ENTMCNC: 33.6 GM/DL
MCV RBC AUTO: 85.5 FL
MONOCYTES # BLD AUTO: 0.9 K/UL
MONOCYTES NFR BLD AUTO: 7.5 %
NEUTROPHILS # BLD AUTO: 9.22 K/UL
NEUTROPHILS NFR BLD AUTO: 77.1 %
PLATELET # BLD AUTO: 133 K/UL
RBC # BLD: 4.7 M/UL
RBC # FLD: 12.7 %
WBC # FLD AUTO: 11.95 K/UL

## 2024-05-31 PROCEDURE — 93000 ELECTROCARDIOGRAM COMPLETE: CPT

## 2024-05-31 PROCEDURE — 99213 OFFICE O/P EST LOW 20 MIN: CPT

## 2024-05-31 RX ORDER — FENTANYL 100 UG/H
100 PATCH, EXTENDED RELEASE TRANSDERMAL
Refills: 0 | Status: ACTIVE | COMMUNITY
Start: 2024-05-31

## 2024-05-31 RX ORDER — PANTOPRAZOLE 40 MG/1
40 TABLET, DELAYED RELEASE ORAL DAILY
Refills: 0 | Status: DISCONTINUED | COMMUNITY
End: 2024-05-31

## 2024-05-31 RX ORDER — INCLISIRAN 284 MG/1.5ML
284 INJECTION, SOLUTION SUBCUTANEOUS
Refills: 0 | Status: ACTIVE | COMMUNITY

## 2024-05-31 RX ORDER — PANTOPRAZOLE 40 MG/1
40 TABLET, DELAYED RELEASE ORAL
Qty: 30 | Refills: 0 | Status: ACTIVE | COMMUNITY
Start: 2024-05-31

## 2024-05-31 RX ORDER — FENTANYL 100 UG/H
100 PATCH, EXTENDED RELEASE TRANSDERMAL
Qty: 15 | Refills: 0 | Status: DISCONTINUED | COMMUNITY
Start: 2022-06-08 | End: 2024-05-31

## 2024-05-31 RX ORDER — MECLIZINE HYDROCHLORIDE 12.5 MG/1
12.5 TABLET ORAL
Qty: 30 | Refills: 0 | Status: DISCONTINUED | COMMUNITY
Start: 2022-05-10 | End: 2024-05-31

## 2024-05-31 NOTE — HISTORY OF PRESENT ILLNESS
[FreeTextEntry1] : Mr. Dykes is POD 2 s/p TF TAVR using a 26mm [place] Tay. His post op course was uncomplicated and he was discharged yesterday with an [facility]OT. There were no medication changes made. He had bifascicular block pre op which remained stable.   He was referred back to our office by the NP from [facility] due to concern of fatigue reported by patient and confirmed on telehealth visit. He arrives today accompanied by his wife. His primary complaint is that of fatigue ("from head to toe") and he says he want to "sleep all day". He also reports exacerbation of his chronic back pain. He denies chest pain or pressure as well as sob. His appetite is normal and he has not had any nausea or vomiting. Though his wife said he had some chills this morning, they deny a fever. He does not have any incisional pain.

## 2024-05-31 NOTE — PHYSICAL EXAM
[Sclera] : the sclera and conjunctiva were normal [PERRL With Normal Accommodation] : pupils were equal in size, round, and reactive to light [Extraocular Movements] : extraocular movements were intact [Neck Appearance] : the appearance of the neck was normal [Neck Cervical Mass (___cm)] : no neck mass was observed [Jugular Venous Distention Increased] : there was no jugular-venous distention [Thyroid Diffuse Enlargement] : the thyroid was not enlarged [Thyroid Nodule] : there were no palpable thyroid nodules [Auscultation Breath Sounds / Voice Sounds] : lungs were clear to auscultation bilaterally [Heart Rate And Rhythm] : heart rate was normal and rhythm regular [Heart Sounds] : normal S1 and S2 [Heart Sounds Gallop] : no gallops [Murmurs] : no murmurs [Heart Sounds Pericardial Friction Rub] : no pericardial rub [Right Carotid Bruit] : no bruit heard over the right carotid [Left Carotid Bruit] : no bruit heard over the left carotid [Right Femoral Bruit] : no bruit heard over the right femoral artery [Left Femoral Bruit] : no bruit heard over the left femoral artery [2+] : left 2+ [No Abnormalities] : the abdominal aorta was not enlarged and no bruit was heard [Bowel Sounds] : normal bowel sounds [Abdomen Soft] : soft [Abdomen Tenderness] : non-tender [] : no hepato-splenomegaly [Abdomen Mass (___ Cm)] : no abdominal mass palpated [Deep Tendon Reflexes (DTR)] : deep tendon reflexes were 2+ and symmetric [Sensation] : the sensory exam was normal to light touch and pinprick [No Focal Deficits] : no focal deficits [Oriented To Time, Place, And Person] : oriented to person, place, and time [Impaired Insight] : insight and judgment were intact [Affect] : the affect was normal

## 2024-05-31 NOTE — ASSESSMENT
[FreeTextEntry1] : Mr. Dykes presents with fatigue POD 2 s/p uncomplicated TF TAVR - EKG was done and reviewed. Unchanged from pre- and pos-op EKGs - MCOT was also reviewed. 10 hours of data available with 1 event of 1'AVB (KS interval similar to EKG) - EKG from yesterday showed normal valve function with no PVL and no pericardial effusion - physical exam was unrevealing, incisions clean and dry with no hematoma b/l - he has a lidocaine patch and a dilaudid patch to manage his chronic neck and back pain - will send labs and he will follow up next week - fatigue may be due to pain medication/anesthesia combination - case discussed, and patient examined, with Dr. Dickerson

## 2024-05-31 NOTE — PLAN
[TextEntry] : 1) Weigh yourself in the AM prior to eating & drinking. Contact FY team if you note a wt gain of 1-2 lbs overnight or 5 lbs within 1 week. Continue current meds. Keep your legs elevated & ambulate as tolerated. Continue incentive spirometry 10x/hr while awake. Shower using mild soap daily. Your diet should be low salt, low fat, high protein. 2) Call FY team 24/7 w/ any questions, issues, or concerns. My number [phone] was provided to the pt. Explained to pt I personally work from Mon to Fri from 8a to 4p but before or after those hours this number still functions 24/7 and pt will get in touch w/ a team member of CT Surgeon at all times. 3) Report to your FY NP any signs of infection such as redness, swelling, warmth, drainage, or pain at an incision site. Additionally, report to your FY NP if you develop a fever. 4) Do not lift anything more than 5 lbs. 5) Do not drive until you are cleared to do so by your surgeon. 6) Please walk 3x/day.  7) Please maintain your MCOT as directed for continued cardiac monitoring.  FOLLOW UP APPOINTMENTS: CTSx: Dr. Dickerson on 5/31 and 6/4  CARDIOLOGIST: Dr Muniz- Pt advised to call to schedule appt in 4 weeks for repeat echocardiogram s/p TAVR PCP: Dr Ross -Pt advised to call to schedule appt within 1 month of discharge.

## 2024-05-31 NOTE — ASSESSMENT
[FreeTextEntry1] : Pt recovering well at home s/p cardiac surgery. Good family support was noted from Yarely (wife). As per Pt he has all medications in home and is taking as prescribed. Pt is aware of F/U appts scheduled and that need to be scheduled as listed below. Pt c/o feeling very weak today and having chills. He appears weak over teleHealth visit. Pt reports towards end of tele visit only feeling 10% better. Spoke to Joao from Dr Dickerson's office. Pt will go in to office for further assessment today for reported weakness.

## 2024-05-31 NOTE — REASON FOR VISIT
[Home] : at home, [unfilled] , at the time of the visit. [Medical Office: ([facility])___] : at the medical office located in  [Patient] : the patient [Post Hospitalization] : a post hospitalization visit [Spouse] : spouse [FreeTextEntry1] : FOLLOW YOUR HEART - Transitional Care Management Program - [facility]

## 2024-05-31 NOTE — PHYSICAL EXAM
[Neck Appearance] : the appearance of the neck was normal [] : no respiratory distress [Respiration, Rhythm And Depth] : normal respiratory rhythm and effort [Exaggerated Use Of Accessory Muscles For Inspiration] : no accessory muscle use [FreeTextEntry1] : MCOT in place. TAVR sites without erythema or drainage, with edges well approximated.  B/L LE - no edema. [Examination Of The Chest] : the chest was normal in appearance [Chest Visual Inspection Thoracic Asymmetry] : no chest asymmetry [Diminished Respiratory Excursion] : normal chest expansion [Abnormal Walk] : normal gait [Skin Color & Pigmentation] : normal skin color and pigmentation [No Focal Deficits] : no focal deficits [Impaired Insight] : insight and judgment were intact [Oriented To Time, Place, And Person] : oriented to person, place, and time [Affect] : the affect was normal [Mood] : the mood was normal

## 2024-06-01 LAB
ALBUMIN SERPL ELPH-MCNC: 4.7 G/DL
ALP BLD-CCNC: 77 U/L
ALT SERPL-CCNC: 32 U/L
ANION GAP SERPL CALC-SCNC: 14 MMOL/L
AST SERPL-CCNC: 32 U/L
BILIRUB SERPL-MCNC: 0.3 MG/DL
BUN SERPL-MCNC: 21 MG/DL
CALCIUM SERPL-MCNC: 10 MG/DL
CHLORIDE SERPL-SCNC: 98 MMOL/L
CO2 SERPL-SCNC: 26 MMOL/L
CREAT SERPL-MCNC: 1.27 MG/DL
EGFR: 59 ML/MIN/1.73M2
GLUCOSE SERPL-MCNC: 120 MG/DL
NT-PROBNP SERPL-MCNC: 271 PG/ML
POTASSIUM SERPL-SCNC: 4.4 MMOL/L
PROT SERPL-MCNC: 7.2 G/DL
SODIUM SERPL-SCNC: 138 MMOL/L

## 2024-06-04 ENCOUNTER — APPOINTMENT (OUTPATIENT)
Dept: CARDIOTHORACIC SURGERY | Facility: CLINIC | Age: 74
End: 2024-06-04
Payer: MEDICARE

## 2024-06-04 VITALS
HEIGHT: 72 IN | DIASTOLIC BLOOD PRESSURE: 79 MMHG | SYSTOLIC BLOOD PRESSURE: 122 MMHG | RESPIRATION RATE: 15 BRPM | TEMPERATURE: 98 F | BODY MASS INDEX: 25.73 KG/M2 | OXYGEN SATURATION: 98 % | HEART RATE: 82 BPM | WEIGHT: 190 LBS

## 2024-06-04 DIAGNOSIS — Z95.3 PRESENCE OF XENOGENIC HEART VALVE: ICD-10-CM

## 2024-06-04 PROCEDURE — 99213 OFFICE O/P EST LOW 20 MIN: CPT

## 2024-06-04 NOTE — ASSESSMENT
[FreeTextEntry1] : Today on exam, patient's lungs are clear bilaterally and bilateral groins are clean, dry and intact. There is no hematoma. No peripheral edema noted on exam.   MCOT reports were reviewed showing no new arrythmias or high degree AV blocks.  Trans thoracic echocardiogram on day of discharge was reviewed during today's visit. No significant interval changes were noted.  Transcatheter deployed (TAVR) valve replacement present in the aortic position.  The prosthetic valve is well seated. No intra valvular regurgitation No para valvular regurgitation.    SBE antibiotic prophylaxis discussed at length.  Patient instructed to continue current medication regimen.   Plan: 1) Follow up with cardiologist 2) Continue medications as prescribed 3) Follow up in 30 days for repeat transthoracic echocardiogram

## 2024-06-27 ENCOUNTER — TRANSCRIPTION ENCOUNTER (OUTPATIENT)
Age: 74
End: 2024-06-27

## 2024-07-18 ENCOUNTER — OUTPATIENT (OUTPATIENT)
Dept: OUTPATIENT SERVICES | Facility: HOSPITAL | Age: 74
LOS: 1 days | End: 2024-07-18
Payer: MEDICARE

## 2024-07-18 ENCOUNTER — RESULT REVIEW (OUTPATIENT)
Age: 74
End: 2024-07-18

## 2024-07-18 DIAGNOSIS — Z98.890 OTHER SPECIFIED POSTPROCEDURAL STATES: Chronic | ICD-10-CM

## 2024-07-18 DIAGNOSIS — I35.0 NONRHEUMATIC AORTIC (VALVE) STENOSIS: ICD-10-CM

## 2024-07-18 DIAGNOSIS — Z90.49 ACQUIRED ABSENCE OF OTHER SPECIFIED PARTS OF DIGESTIVE TRACT: Chronic | ICD-10-CM

## 2024-07-18 PROCEDURE — 93306 TTE W/DOPPLER COMPLETE: CPT

## 2024-07-18 PROCEDURE — 93356 MYOCRD STRAIN IMG SPCKL TRCK: CPT

## 2024-07-18 PROCEDURE — 93306 TTE W/DOPPLER COMPLETE: CPT | Mod: 26

## 2024-09-18 ENCOUNTER — LABORATORY RESULT (OUTPATIENT)
Age: 74
End: 2024-09-18

## 2024-09-19 ENCOUNTER — APPOINTMENT (OUTPATIENT)
Dept: UROLOGY | Facility: CLINIC | Age: 74
End: 2024-09-19
Payer: MEDICARE

## 2024-09-19 PROCEDURE — G2211 COMPLEX E/M VISIT ADD ON: CPT

## 2024-09-19 PROCEDURE — 99214 OFFICE O/P EST MOD 30 MIN: CPT

## 2024-09-19 NOTE — HISTORY OF PRESENT ILLNESS
[FreeTextEntry1] : Previous prostate cancer s/p high-dose radiation therapy HDR at [facility] for [place] 7 prostate cancer PSA today  Aortic value replacement on plavix and ASA  BPH no good.  Increase rapflo 8 mg.  Follow-up 1 year.  Please refer to URO Consult Note.

## 2024-09-19 NOTE — ADDENDUM
[FreeTextEntry1] : Entered by Constantino Moss, acting as scribe for Dr. Orlando Lewis. The documentation recorded by the scribe accurately reflects the service I personally performed and the decisions made by me.

## 2024-09-19 NOTE — LETTER BODY
[FreeTextEntry1] : Orlando Genao MD [address] [place], [place] [id number] [phone]  Reason for Visit: Elevated PSA. Previous prostate cancer. BPH  This is a 74 year-old gentleman with a history of chronic back pain and hypogonadism presenting with prostate cancer and BPH. Patient previously had on testosterone placement therapy. Patient states that he stopped taking injections since October. His recent PSA increased to 5.73. Repeat prostate MRI demonstrated a millimeter PI-RADS 4 lesion. Prostate biopsy demonstrated [place] two cores each of 6 and 7 prostate cancer. Patient is status post high-dose radiation therapy at [facility] for [place] 7 prostate cancer. He has also had aortic valve replacement on Plavix and ASA.  Patient returns today to for follow-up. Since he was last seen, the patient reports no improvement to his symptoms of BPH despite medical therapy. the patient denies any hematuria. The patient denies any interference of function. All other review of systems are negative. He has lung cancer in his family medical history. He has previous surgical history. Past medical history, family history and social history were inquired and were noncontributory to current condition. The patient does not drink alcohol. He was a former smoker. Medications and allergies were reviewed. He has no known allergies to medication.  On examination, the patient is in no acute distress. He is alert and oriented follows commands. He has normal mood and affect. He is normocephalic. Neck is supple. Oral no thrush. Respirations are unlabored. His abdomen is soft and nontender. Bladder is nonpalpable. No CVA tenderness. Neurologically he is grossly intact. No peripheral edema. Skin without gross abnormality.   His BMP demonstrated decreased renal functions, creatinine 1.34. His PSA was 0.63, which is within normal limits.    Assessment: Elevated PSA. Prostate cancer. Hypogonadism. BPH.  I counseled the patient. In terms of his prostate cancer, patient is status post high-dose radiation therapy at [facility] for [place] 7 prostate cancer. His most recent PSA was normal. I recommended the patient repeat PSA and BMP to ensure stability. In terms of his BPH, the patient reports no improvement to his urinary symptoms despite medical therapy. I recommended the patient increase Rapaflo to 8 mg.  I renewed the patient's prescription for Rapaflo today. I encouraged the patient to continue medications regularly as directed. The risks and benefits were discussed. Alternatives were given. I answered the patient questions. The patient will follow-up as directed and will contact me with any questions or concerns. Thank you for the opportunity to participate in the care of this patient, I will keep you updated on his progress. Thank you for the opportunity to participate in the care of this patient. I'll keep you updated on his progress.  Patient will continue longitudinal care for his complex and serious chronic condition.  Plan: Increase Rapaflo to 8 mg. PSA. BMP. Follow-up in 1 year.  I spent 30-minutes time today on all issues related to this patient on today date of service including non face to face time.

## 2024-10-14 ENCOUNTER — RESULT REVIEW (OUTPATIENT)
Age: 74
End: 2024-10-14

## 2024-10-14 ENCOUNTER — INPATIENT (INPATIENT)
Facility: HOSPITAL | Age: 74
LOS: 0 days | Discharge: HOME | DRG: 313 | End: 2024-10-15
Attending: STUDENT IN AN ORGANIZED HEALTH CARE EDUCATION/TRAINING PROGRAM | Admitting: STUDENT IN AN ORGANIZED HEALTH CARE EDUCATION/TRAINING PROGRAM
Payer: MEDICARE

## 2024-10-14 VITALS
SYSTOLIC BLOOD PRESSURE: 131 MMHG | TEMPERATURE: 98 F | HEART RATE: 72 BPM | RESPIRATION RATE: 17 BRPM | HEIGHT: 75 IN | WEIGHT: 194.01 LBS | OXYGEN SATURATION: 97 % | DIASTOLIC BLOOD PRESSURE: 81 MMHG

## 2024-10-14 DIAGNOSIS — R07.9 CHEST PAIN, UNSPECIFIED: ICD-10-CM

## 2024-10-14 DIAGNOSIS — Z98.890 OTHER SPECIFIED POSTPROCEDURAL STATES: Chronic | ICD-10-CM

## 2024-10-14 DIAGNOSIS — Z90.49 ACQUIRED ABSENCE OF OTHER SPECIFIED PARTS OF DIGESTIVE TRACT: Chronic | ICD-10-CM

## 2024-10-14 LAB
ALBUMIN SERPL ELPH-MCNC: 4.7 G/DL — SIGNIFICANT CHANGE UP (ref 3.3–5)
ALP SERPL-CCNC: 89 U/L — SIGNIFICANT CHANGE UP (ref 40–120)
ALT FLD-CCNC: 28 U/L — SIGNIFICANT CHANGE UP (ref 10–45)
ANION GAP SERPL CALC-SCNC: 13 MMOL/L — SIGNIFICANT CHANGE UP (ref 5–17)
APTT BLD: 30.1 SEC — SIGNIFICANT CHANGE UP (ref 24.5–35.6)
AST SERPL-CCNC: 31 U/L — SIGNIFICANT CHANGE UP (ref 10–40)
BASOPHILS # BLD AUTO: 0.01 K/UL — SIGNIFICANT CHANGE UP (ref 0–0.2)
BASOPHILS NFR BLD AUTO: 0.1 % — SIGNIFICANT CHANGE UP (ref 0–2)
BILIRUB SERPL-MCNC: 0.5 MG/DL — SIGNIFICANT CHANGE UP (ref 0.2–1.2)
BUN SERPL-MCNC: 21 MG/DL — SIGNIFICANT CHANGE UP (ref 7–23)
CALCIUM SERPL-MCNC: 10.1 MG/DL — SIGNIFICANT CHANGE UP (ref 8.4–10.5)
CHLORIDE SERPL-SCNC: 100 MMOL/L — SIGNIFICANT CHANGE UP (ref 96–108)
CO2 SERPL-SCNC: 25 MMOL/L — SIGNIFICANT CHANGE UP (ref 22–31)
CREAT SERPL-MCNC: 1.53 MG/DL — HIGH (ref 0.5–1.3)
D DIMER BLD IA.RAPID-MCNC: <150 NG/ML DDU — SIGNIFICANT CHANGE UP
EGFR: 47 ML/MIN/1.73M2 — LOW
EOSINOPHIL # BLD AUTO: 0.05 K/UL — SIGNIFICANT CHANGE UP (ref 0–0.5)
EOSINOPHIL NFR BLD AUTO: 0.5 % — SIGNIFICANT CHANGE UP (ref 0–6)
GLUCOSE SERPL-MCNC: 135 MG/DL — HIGH (ref 70–99)
HCT VFR BLD CALC: 39.4 % — SIGNIFICANT CHANGE UP (ref 39–50)
HGB BLD-MCNC: 13.3 G/DL — SIGNIFICANT CHANGE UP (ref 13–17)
IMM GRANULOCYTES NFR BLD AUTO: 0.6 % — SIGNIFICANT CHANGE UP (ref 0–0.9)
INR BLD: 1.08 RATIO — SIGNIFICANT CHANGE UP (ref 0.85–1.16)
LYMPHOCYTES # BLD AUTO: 1.44 K/UL — SIGNIFICANT CHANGE UP (ref 1–3.3)
LYMPHOCYTES # BLD AUTO: 13.4 % — SIGNIFICANT CHANGE UP (ref 13–44)
MCHC RBC-ENTMCNC: 28.2 PG — SIGNIFICANT CHANGE UP (ref 27–34)
MCHC RBC-ENTMCNC: 33.8 GM/DL — SIGNIFICANT CHANGE UP (ref 32–36)
MCV RBC AUTO: 83.5 FL — SIGNIFICANT CHANGE UP (ref 80–100)
MONOCYTES # BLD AUTO: 0.58 K/UL — SIGNIFICANT CHANGE UP (ref 0–0.9)
MONOCYTES NFR BLD AUTO: 5.4 % — SIGNIFICANT CHANGE UP (ref 2–14)
NEUTROPHILS # BLD AUTO: 8.63 K/UL — HIGH (ref 1.8–7.4)
NEUTROPHILS NFR BLD AUTO: 80 % — HIGH (ref 43–77)
NRBC # BLD: 0 /100 WBCS — SIGNIFICANT CHANGE UP (ref 0–0)
NT-PROBNP SERPL-SCNC: 125 PG/ML — SIGNIFICANT CHANGE UP (ref 0–300)
PLATELET # BLD AUTO: 191 K/UL — SIGNIFICANT CHANGE UP (ref 150–400)
POTASSIUM SERPL-MCNC: 4.5 MMOL/L — SIGNIFICANT CHANGE UP (ref 3.5–5.3)
POTASSIUM SERPL-SCNC: 4.5 MMOL/L — SIGNIFICANT CHANGE UP (ref 3.5–5.3)
PROT SERPL-MCNC: 7.7 G/DL — SIGNIFICANT CHANGE UP (ref 6–8.3)
PROTHROM AB SERPL-ACNC: 12.3 SEC — SIGNIFICANT CHANGE UP (ref 9.9–13.4)
RBC # BLD: 4.72 M/UL — SIGNIFICANT CHANGE UP (ref 4.2–5.8)
RBC # FLD: 12.6 % — SIGNIFICANT CHANGE UP (ref 10.3–14.5)
SODIUM SERPL-SCNC: 138 MMOL/L — SIGNIFICANT CHANGE UP (ref 135–145)
TROPONIN T, HIGH SENSITIVITY RESULT: 36 NG/L — SIGNIFICANT CHANGE UP (ref 0–51)
TROPONIN T, HIGH SENSITIVITY RESULT: 37 NG/L — SIGNIFICANT CHANGE UP (ref 0–51)
WBC # BLD: 10.77 K/UL — HIGH (ref 3.8–10.5)
WBC # FLD AUTO: 10.77 K/UL — HIGH (ref 3.8–10.5)

## 2024-10-14 PROCEDURE — 71045 X-RAY EXAM CHEST 1 VIEW: CPT | Mod: 26

## 2024-10-14 PROCEDURE — 99285 EMERGENCY DEPT VISIT HI MDM: CPT | Mod: GC

## 2024-10-14 PROCEDURE — 93306 TTE W/DOPPLER COMPLETE: CPT | Mod: 26

## 2024-10-14 RX ORDER — ASPIRIN 325 MG
81 TABLET ORAL DAILY
Refills: 0 | Status: DISCONTINUED | OUTPATIENT
Start: 2024-10-14 | End: 2024-10-15

## 2024-10-14 RX ORDER — LIDOCAINE 50 MG/G
1 CREAM TOPICAL DAILY
Refills: 0 | Status: DISCONTINUED | OUTPATIENT
Start: 2024-10-14 | End: 2024-10-15

## 2024-10-14 RX ORDER — TAMSULOSIN HCL 0.4 MG
0.4 CAPSULE ORAL AT BEDTIME
Refills: 0 | Status: DISCONTINUED | OUTPATIENT
Start: 2024-10-14 | End: 2024-10-15

## 2024-10-14 RX ORDER — SILODOSIN 8 MG/1
1 CAPSULE ORAL
Refills: 0 | DISCHARGE

## 2024-10-14 RX ORDER — SODIUM CHLORIDE 0.9 % (FLUSH) 0.9 %
1000 SYRINGE (ML) INJECTION
Refills: 0 | Status: DISCONTINUED | OUTPATIENT
Start: 2024-10-14 | End: 2024-10-15

## 2024-10-14 RX ORDER — ASPIRIN 325 MG
1 TABLET ORAL
Refills: 0 | DISCHARGE

## 2024-10-14 RX ORDER — ACETAMINOPHEN 325 MG
1000 TABLET ORAL ONCE
Refills: 0 | Status: COMPLETED | OUTPATIENT
Start: 2024-10-14 | End: 2024-10-14

## 2024-10-14 RX ORDER — PANTOPRAZOLE SODIUM 40 MG/1
40 TABLET, DELAYED RELEASE ORAL
Refills: 0 | Status: DISCONTINUED | OUTPATIENT
Start: 2024-10-14 | End: 2024-10-15

## 2024-10-14 RX ADMIN — Medication 1000 MILLIGRAM(S): at 23:37

## 2024-10-14 RX ADMIN — Medication 75 MILLILITER(S): at 14:57

## 2024-10-14 RX ADMIN — Medication 400 MILLIGRAM(S): at 22:37

## 2024-10-14 RX ADMIN — LIDOCAINE 1 PATCH: 50 CREAM TOPICAL at 23:49

## 2024-10-14 RX ADMIN — Medication 0.4 MILLIGRAM(S): at 22:36

## 2024-10-14 NOTE — ED ADULT NURSE NOTE - NSFALLHARMRISKINTERV_ED_ALL_ED
Assistance OOB with selected safe patient handling equipment if applicable/Communicate risk of Fall with Harm to all staff, patient, and family/Monitor gait and stability/Provide patient with walking aids/Provide visual cue: red socks, yellow wristband, yellow gown, etc/Reinforce activity limits and safety measures with patient and family/Bed in lowest position, wheels locked, appropriate side rails in place/Call bell, personal items and telephone in reach/Instruct patient to call for assistance before getting out of bed/chair/stretcher/Non-slip footwear applied when patient is off stretcher/[place] to call system/Physically safe environment - no spills, clutter or unnecessary equipment/Purposeful Proactive Rounding/Room/bathroom lighting operational, light cord in reach

## 2024-10-14 NOTE — ED PROVIDER NOTE - PHYSICAL EXAMINATION
General: no acute distress  Psych: mood appropriate  Head: normocephalic; atraumatic  Eyes: conjunctivae clear bilaterally, sclerae anicteric  ENT: no nasal flaring, patent nares  Cardio: regular rate and rhythm; normal heart sounds  Resp: clear to auscultation bilaterally  GI: abdomen soft, nontender, nondistended  Neuro: strength 5/5 in upper and lower extremities; normal sensation; A&Ox3  Skin: no rashes or bruising noted  MSK: normal movement of extremities  Lymph/Vasc: 2+ PT and radial pulses

## 2024-10-14 NOTE — ED ADULT NURSE NOTE - OBJECTIVE STATEMENT
Pt is a 75 yo M BIB EMS w/ PMHx of CAD, stent placement, HTN, and prostate cancer complaining of chest pain. Pt states he has has intermittent chest pain since this morning that felt like "shock waves". Pt reports pain was isolated to his lower left pectoral. Pt denies any lightheadedness or nausea. Pt denies current chest pain. Pt is aox4, airway clear and patent, equal chest rise and fall, pulses intact, skin warm and dry, no swelling to extremities, ABD non tender and non distended x4 quadrants, NSR. Pt denies LOC, SOB, current chest pain, D/N/V, or any other pain. Pt placed on cardiac monitor. Pt placed in locked lowered stretcher w. rails raised.

## 2024-10-14 NOTE — ED PROVIDER NOTE - CLINICAL SUMMARY MEDICAL DECISION MAKING FREE TEXT BOX
REZA Couch PGY2- patient here with multiple 1-second episodes of L chest pain since this AM, describes as electrical shock/ripping sensation. REZA Couch PGY2- patient here with multiple 1-second episodes of L chest pain since this AM, describes as electrical shock/ripping sensation. regular rate and rhythm on exam, vitals WNL. biggest concern for ACS given hx, will obtain labs, troponin, keep on monitor. also considered aortic dissection given that patient described pain as ripping however pain not radiating, not constant or severe, on exam patient with no focal decreased sensation, pulse deficit, has intact and equal motor strength.

## 2024-10-14 NOTE — CONSULT NOTE ADULT - TIME BILLING
Face to face time with patient and his wife in ED discussing case and performing examination.  Review of external and hospital records including independent review of labs and testing.  Discussion of care plan with consulting services and outpatient physicians.  Coordination of care.

## 2024-10-14 NOTE — H&P ADULT - NS ATTEND AMEND GEN_ALL_CORE FT
75yo M PMH: CAD s/p stent January 2024 on DAPT, TAVR in May 2024 ([facility]), prostate cancer (last radiation ~1 mo ago), HTN, HLD. Presents to [facility] for chest pain x 1 day.    # Chest pain ro ACS  # S/p TAVR/ CAD/ HTN/ HLD  - first trop 37, fu repeat  - Monitor on tele  - Echo  - Cardio consult called    # DARIUS  - start NS 75cc/hr for 12 hrs  - Continue to monitor    [facility]  [phone]

## 2024-10-14 NOTE — H&P ADULT - ASSESSMENT
This is a 74 year old male PMH: CAD s/p stent January 2024 on DAPT, TAVR in May 2024 ([facility]), prostate cancer (last radiation ~1 mo ago), HTN, HLD. Presents to [facility] for chest pain x 1 day.    Plan:    # Chest pain:  - WBC's 10K, H/H stable.   - Trops 37,continue to trend  - CXR pending, f/u report  - pain control  - Monitor on tele  - Echo pending  - Cards eval pending (called)    # DARIUS:  - Monitor I/O's  - Serial Cr  - Avoid nephrotoxins  - Renally dose medications  - Continue to monitor    # S/p TAVR/ CAD/ HTN/ HLD:  - Monitor on tele  - Continue w/ home meds, per Cards rec's, pending pharmacy verification of medication  - Cards eval pending    # GI ppx:  - Bowel regimen prn    # DVT ppx:  - IPC's    [facility]  [phone]

## 2024-10-14 NOTE — ED PROVIDER NOTE - OBJECTIVE STATEMENT
The patient is a 73 y/o M with past medical history of CAD s/p stent January 2024 on DAPT, TAVR in May 2024 ([facility]), prostate cancer (last radiation ~1 mo ago), HTN, HLD, presenting with nonradiating left sided chest pain since 2-3am this morning. Pain is described as brief "electrical shock" sensation, and patient adds that he feels like his heart is "ripping" when he has these episodes. Since onset this AM patient states has had 20-30 of these episodes which occur randomly, no apparent associated with exertion, each episode lasting approx 1 second. Possible associated diaphoresis though notes EMS bus was warm, no associated dyspnea, headache, visual changes, syncope, nausea, vomiting, focal numbness/tingling, loss of sensation or loss of function of extremities. Given  by EMS en route.

## 2024-10-14 NOTE — ED PROVIDER NOTE - NSICDXPASTMEDICALHX_GEN_ALL_CORE_FT
PAST MEDICAL HISTORY:  DARIUS (acute kidney injury)     Aortic stenosis     CAD (coronary artery disease)     Former smoker     GERD (gastroesophageal reflux disease)     HLD (hyperlipidemia)     Prostate CA

## 2024-10-14 NOTE — H&P ADULT - NSHPREVIEWOFSYSTEMS_GEN_ALL_CORE
GENERAL: no weakness, no fever/chills, no weight loss/gain  EYES/ENT: No visual changes, no vertigo or throat pain  NECK: No pain or stiffness   RESPIRATORY: no cough, no wheezing, no hemoptysis, no dyspnea, no shortness of breath  CARDIOVASCULAR: + Chest pain  GASTROINTESTINAL: no n/v/d, no abdominal or epigastric pain  GENITOURINARY: no dysuria, no frequency, no nocturia, no hematuria  MUSCULOSKELETAL: no trauma, no sprain/strain, no myalgias, no arthralgias, no fracture  NEUROLOGICAL: no HA, no dizziness, no weakness, no numbness  SKIN: No itching, rashes

## 2024-10-14 NOTE — H&P ADULT - NSHPLABSRESULTS_GEN_ALL_CORE
LABS:                        13.3   10.77 )-----------( 191      ( 14 Oct 2024 10:09 )             39.4     10-14    138  |  100  |  21  ----------------------------<  135[H]  4.5   |  25  |  1.53[H]    Ca    10.1      14 Oct 2024 10:09    TPro  7.7  /  Alb  4.7  /  TBili  0.5  /  DBili  x   /  AST  31  /  ALT  28  /  AlkPhos  89  10-14    PT/INR - ( 14 Oct 2024 10:09 )   PT: 12.3 sec;   INR: 1.08 ratio         PTT - ( 14 Oct 2024 10:09 )  PTT:30.1 sec  CAPILLARY BLOOD GLUCOSE            Urinalysis Basic - ( 14 Oct 2024 10:09 )    Color: x / Appearance: x / SG: x / pH: x  Gluc: 135 mg/dL / Ketone: x  / Bili: x / Urobili: x   Blood: x / Protein: x / Nitrite: x   Leuk Esterase: x / RBC: x / WBC x   Sq Epi: x / Non Sq Epi: x / Bacteria: x        RADIOLOGY & ADDITIONAL TESTS:     Imaging Personally Reviewed:  [x] YES  [ ] NO    Consultant(s) Notes Reviewed:  [x] YES  [ ] NO    MEDICATIONS  (STANDING):    MEDICATIONS  (PRN):      Care Discussed with Consultants/Other Providers [x] YES  [ ] NO    Vital Signs Last 24 Hrs  T(C): 36.7 (14 Oct 2024 11:51), Max: 36.7 (14 Oct 2024 11:51)  T(F): 98 (14 Oct 2024 11:51), Max: 98 (14 Oct 2024 11:51)  HR: 63 (14 Oct 2024 11:51) (63 - 72)  BP: 139/80 (14 Oct 2024 11:51) (123/81 - 139/80)  BP(mean): 92 (14 Oct 2024 11:51) (92 - 92)  RR: 18 (14 Oct 2024 11:51) (17 - 18)  SpO2: 98% (14 Oct 2024 11:51) (97% - 98%)    Parameters below as of 14 Oct 2024 11:51  Patient On (Oxygen Delivery Method): room air      I&O's Summary

## 2024-10-14 NOTE — CONSULT NOTE ADULT - SUBJECTIVE AND OBJECTIVE BOX
HPI:  This is a 74 year old male PMH: CAD s/p PCI to RCA in Feb 2024 on DAPT, TAVR in May 2024 ([facility]), prostate cancer (last radiation ~1 mo ago), HTN, HLD. Presents to [facility] for chest pain x 1 day. Patient reports yesterday he was walking and felt more fatigued than usual.  Last night, around 3 am, he woke up with a 'shock-like explosion' in his chest with associated diaphoresis.  Pain was rated 8/10 and described as an intermittent sharp shocking pain that was non radiating. He reports similar sensations since January 2024 occuring multiple times/week, worse with laying on his left side, no relation to exertion. Denies any palpitations, fever, chills, sob, headaches, dizziness, lightheadedness, N/V/D , abd pain or changes in elimination patterns. Admitted for further evaluation.  Interventional/Structural cardiology was consulted for evaluation.    Patient chest pain free on exam.  We discussed his work-up thus far and care plan moving forward.  All questions answered.      PMHx:   Aortic stenosis    GERD (gastroesophageal reflux disease)    Prostate CA    DARIUS (acute kidney injury)    CAD (coronary artery disease)    HLD (hyperlipidemia)    Former smoker        PSHx:   History of cardiac cath    History of prostate surgery    Previous back surgery    H/O hernia repair    S/P cholecystectomy    H/O nasal septoplasty    H/O neck surgery        Allergies:  statins (Muscle Pain; Joint Pain)      Home Meds: As per admission medication reconciliation     Current Medications:   sodium chloride 0.9%. 1000 milliLiter(s) IV Continuous <Continuous>      FAMILY HISTORY:      Social History:    REVIEW OF SYSTEMS:  Constitutional:     [x ] negative [ ] fevers [ ] chills [ ] weight loss [ ] weight gain  HEENT:                  [x ] negative [ ] dry eyes [ ] eye irritation [ ] postnasal drip [ ] nasal congestion  CV:                         [ x] negative  [ ] chest pain [ ] orthopnea [ ] palpitations [ ] murmur  Resp:                     [x ] negative [ ] cough [ ] shortness of breath [ ] dyspnea [ ] wheezing [ ] sputum [ ]hemoptysis  GI:                          [ x] negative [ ] nausea [ ] vomiting [ ] diarrhea [ ] constipation [ ] abd pain [ ] dysphagia   :                        [ x] negative [ ] dysuria [ ] nocturia [ ] hematuria [ ] increased urinary frequency  Musculoskeletal: [x ] negative [ ] back pain [ ] myalgias [ ] arthralgias [ ] fracture  Skin:                       [ x] negative [ ] rash [ ] itch  Neurological:        [ x] negative [ ] headache [ ] dizziness [ ] syncope [ ] weakness [ ] numbness  Psychiatric:           [ x] negative [ ] anxiety [ ] depression  Endocrine:            [ x] negative [ ] diabetes [ ] thyroid problem  Heme/Lymph:      [ x] negative [ ] anemia [ ] bleeding problem  Allergic/Immune: [ x] negative [ ] itchy eyes [ ] nasal discharge [ ] hives [ ] angioedema    [ x] All other systems negative  [ ] Unable to assess ROS due to      Physical Exam:  T(F): 99.5 (10-14), Max: 99.5 (10-14)  HR: 62 (10-14) (62 - 72)  BP: 121/75 (10-14) (121/75 - 139/80)  RR: 18 (10-14)  SpO2: 96% (10-14)  General: Alert, no acute distress, appears comfortable   HEENT: No scleral icterus, EOMI, no facial dysmorphia, no external ear lesions   Cardiac: Regular rate and rhythm, no murmurs, no rubs, no gallops   Pulmonary: Clear breath sounds throughout, no wheezing, no stridor, no crackles   Abdomen: Nondistended, nontender, appears soft   Skin: no obvious rash or lesions   Extremities: no LE edema  Neurological: Moving all 4 extremities, no overt focal deficits noted   Psych: normal mood and affect     Cardiovascular Diagnostic Testing:    ECG: 10/14/24  NORMAL SINUS RHYTHM  RIGHT BUNDLE BRANCH BLOCK  LEFT ANTERIOR FASCICULAR BLOCK  *** BIFASCICULAR BLOCK ***    CXR: 10/14/24  WNL    Labs: Personally reviewed                        13.3   10.77 )-----------( 191      ( 14 Oct 2024 10:09 )             39.4     10-14    138  |  100  |  21  ----------------------------<  135[H]  4.5   |  25  |  1.53[H]    Ca    10.1      14 Oct 2024 10:09    TPro  7.7  /  Alb  4.7  /  TBili  0.5  /  DBili  x   /  AST  31  /  ALT  28  /  AlkPhos  89  10-14    PT/INR - ( 14 Oct 2024 10:09 )   PT: 12.3 sec;   INR: 1.08 ratio         PTT - ( 14 Oct 2024 10:09 )  PTT:30.1 sec

## 2024-10-14 NOTE — H&P ADULT - HISTORY OF PRESENT ILLNESS
This is a 74 year old male PMH: CAD s/p stent January 2024 on DAPT, TAVR in May 2024 ([facility]), prostate cancer (last radiation ~1 mo ago), HTN, HLD. Presents to [facility] for chest pain x 1 day. Chest pain started around 3 am, unprovoked. Pain was rated 8/10 and described as an intermittent sharp shocking pain that was non radiating. Denies any known exacerbating or alleviating factors. Associated symptoms were fatigue and diaphoresis. Denies any palpitations, fever, chills, sob, headaches, dizziness, lightheadedness, N/V/D , abd pain or changes in elimination patterns. Admitted for further evaluation.

## 2024-10-14 NOTE — CONSULT NOTE ADULT - SUBJECTIVE AND OBJECTIVE BOX
DATE OF SERVICE: 10-14-24 @ 15:21    CHIEF COMPLAINT:Patient is a 74y old  Male who presents with a chief complaint of Chest pain (14 Oct 2024 12:01)      HISTORY OF PRESENT ILLNESS:HPI:  This is a 74 year old male PMH: CAD s/p stent January 2024 on DAPT, TAVR in May 2024 ([facility]erson [facility]), prostate cancer (last radiation ~1 mo ago), HTN, HLD. Presents to [facility] for chest pain x 1 day. Chest pain started around 3 am, unprovoked. Pain was rated 8/10 and described as an intermittent sharp shocking pain that was non radiating. Denies any known exacerbating or alleviating factors. Associated symptoms were fatigue and diaphoresis. Denies any palpitations, fever, chills, sob, headaches, dizziness, lightheadedness, N/V/D , abd pain or changes in elimination patterns. Admitted for further evaluation.    (14 Oct 2024 12:01)      PAST MEDICAL & SURGICAL HISTORY:  Aortic stenosis      GERD (gastroesophageal reflux disease)      Prostate CA      DARIUS (acute kidney injury)      CAD (coronary artery disease)      HLD (hyperlipidemia)      Former smoker      History of cardiac cath      History of prostate surgery      Previous back surgery      H/O hernia repair      S/P cholecystectomy      H/O nasal septoplasty      H/O neck surgery              MEDICATIONS:              sodium chloride 0.9%. 1000 milliLiter(s) IV Continuous <Continuous>      FAMILY HISTORY:      Non-contributory    SOCIAL HISTORY:  not a current smoker  Allergies    statins (Muscle Pain; Joint Pain)    Intolerances    	    REVIEW OF SYSTEMS:  CONSTITUTIONAL: No fever  EYES: No eye pain, visual disturbances, or discharge  ENMT:  No difficulty hearing, tinnitus  NECK: No pain or stiffness  RESPIRATORY: No cough, wheezing,  CARDIOVASCULAR: + chest pain, no palpitations, passing out, dizziness, or leg swelling  GASTROINTESTINAL:  No nausea, vomiting, diarrhea or constipation. No melena.  GENITOURINARY: No dysuria, hematuria  NEUROLOGICAL: No stroke like symptoms  SKIN: No burning or lesions   ENDOCRINE: No heat or cold intolerance  MUSCULOSKELETAL: No joint pain or swelling  PSYCHIATRIC: No  anxiety, mood swings  HEME/LYMPH: No bleeding gums  ALLERGY AND IMMUNOLOGIC: No hives or eczema	    All other ROS negative    PHYSICAL EXAM:  T(C): 37.5 (10-14-24 @ 12:50), Max: 37.5 (10-14-24 @ 12:50)  HR: 62 (10-14-24 @ 12:50) (62 - 72)  BP: 121/75 (10-14-24 @ 12:50) (121/75 - 139/80)  RR: 18 (10-14-24 @ 12:50) (17 - 18)  SpO2: 96% (10-14-24 @ 12:50) (96% - 98%)  Wt(kg): --  I&O's Summary      Appearance: Normal	  HEENT:   Normal oral mucosa, EOMI	  Cardiovascular:  S1 S2, No JVD,    Respiratory: Lungs clear to auscultation	  Psychiatry: Alert  Gastrointestinal:  Soft, Non-tender, + BS	  Skin: No rashes   Neurologic: Non-focal  Extremities:  No edema  Vascular: Peripheral pulses palpable    	    	  	  CARDIAC MARKERS:  Labs personally reviewed by me                                  13.3   10.77 )-----------( 191      ( 14 Oct 2024 10:09 )             39.4     10-14    138  |  100  |  21  ----------------------------<  135[H]  4.5   |  25  |  1.53[H]    Ca    10.1      14 Oct 2024 10:09    TPro  7.7  /  Alb  4.7  /  TBili  0.5  /  DBili  x   /  AST  31  /  ALT  28  /  AlkPhos  89  10-14          EKG: Personally reviewed by me - not on file  Radiology: Personally reviewed by me -   < from: Xray Chest 1 View- PORTABLE-Urgent (10.14.24 @ 11:08) >  IMPRESSION:  Clear lungs.          Assessment /Plan:     This is a 74 year old male PMH: CAD s/p stent January 2024 on DAPT, TAVR in May 2024 ([facility]), prostate cancer (last radiation ~1 mo ago), HTN, HLD. Presents to [facility] for chest pain x 1 day. Chest pain started around 3 am, unprovoked. Pain was rated 8/10 and described as an intermittent sharp shocking pain that was non radiating. Denies any known exacerbating or alleviating factors. Associated symptoms were fatigue and diaphoresis. Denies any palpitations, fever, chills, sob, headaches, dizziness, lightheadedness, N/V/D , abd pain or changes in elimination patterns. Admitted for further evaluation.     1. Atypical chest pain  - chest pain per hpi  - trops 37->36  -CXR clear lungs  - proBNP 125  - TTE pending  - check pharm nuclear stress test  - ctm on tele    2. CAD s/p stent (1/2024) TAVR 5/2024  - c/w DAPT, ASA 81mg qd and Plavix 5mg qd    3. HTN  - bp wnl     4, HLD  - check lipids          Differential diagnosis and plan of care discussed with patient after the evaluation. Counseling on diet, nutritional counseling, weight management, exercise and medication compliance was done.   Advanced care planning/advanced directives discussed with patient/family. DNR status including forceful chest compressions to attempt to restart the heart, ventilator support/artificial breathing, electric shock, artificial nutrition, health care proxy, Molst form all discussed with pt. Pt wishes to consider. More than fifteen minutes spent on discussing advanced directives.     Iolani Behrbom, AG-ALEXEY Cross DO formerly [facility]  Cardiovascular Medicine  [address] [place] [address] Dr, [address]  Available for call or text via [employer]  Office [phone]   DATE OF SERVICE: 10-14-24 @ 15:21    CHIEF COMPLAINT:Patient is a 74y old  Male who presents with a chief complaint of Chest pain (14 Oct 2024 12:01)      HISTORY OF PRESENT ILLNESS:HPI:  This is a 74 year old male PMH: CAD s/p stent January 2024 on DAPT, TAVR in May 2024 ([facility]), prostate cancer (last radiation ~1 mo ago), HTN, HLD. Presents to [facility] for chest pain x 1 day. Chest pain started around 3 am, unprovoked. Pain was rated 8/10 and described as an intermittent sharp shocking pain that was non radiating. Denies any known exacerbating or alleviating factors. Associated symptoms were fatigue and diaphoresis. Denies any palpitations, fever, chills, sob, headaches, dizziness, lightheadedness, N/V/D , abd pain or changes in elimination patterns. Admitted for further evaluation.    (14 Oct 2024 12:01)      PAST MEDICAL & SURGICAL HISTORY:  Aortic stenosis      GERD (gastroesophageal reflux disease)      Prostate CA      DARIUS (acute kidney injury)      CAD (coronary artery disease)      HLD (hyperlipidemia)      Former smoker      History of cardiac cath      History of prostate surgery      Previous back surgery      H/O hernia repair      S/P cholecystectomy      H/O nasal septoplasty      H/O neck surgery        MEDICATIONS:      sodium chloride 0.9%. 1000 milliLiter(s) IV Continuous <Continuous>      FAMILY HISTORY:      Non-contributory    SOCIAL HISTORY:  not a current smoker  Allergies    statins (Muscle Pain; Joint Pain)    Intolerances    	    REVIEW OF SYSTEMS:  CONSTITUTIONAL: No fever  EYES: No eye pain, visual disturbances, or discharge  ENMT:  No difficulty hearing, tinnitus  NECK: No pain or stiffness  RESPIRATORY: No cough, wheezing,  CARDIOVASCULAR: + chest pain, no palpitations, passing out, dizziness, or leg swelling  GASTROINTESTINAL:  No nausea, vomiting, diarrhea or constipation. No melena.  GENITOURINARY: No dysuria, hematuria  NEUROLOGICAL: No stroke like symptoms  SKIN: No burning or lesions   ENDOCRINE: No heat or cold intolerance  MUSCULOSKELETAL: No joint pain or swelling  PSYCHIATRIC: No  anxiety, mood swings  HEME/LYMPH: No bleeding gums  ALLERGY AND IMMUNOLOGIC: No hives or eczema	    All other ROS negative    PHYSICAL EXAM:  T(C): 37.5 (10-14-24 @ 12:50), Max: 37.5 (10-14-24 @ 12:50)  HR: 62 (10-14-24 @ 12:50) (62 - 72)  BP: 121/75 (10-14-24 @ 12:50) (121/75 - 139/80)  RR: 18 (10-14-24 @ 12:50) (17 - 18)  SpO2: 96% (10-14-24 @ 12:50) (96% - 98%)  Wt(kg): --  I&O's Summary      Appearance: Normal	  HEENT:   Normal oral mucosa, EOMI	  Cardiovascular:  S1 S2, No JVD,    Respiratory: Lungs clear to auscultation	  Psychiatry: Alert  Gastrointestinal:  Soft, Non-tender, + BS	  Skin: No rashes   Neurologic: Non-focal  Extremities:  No edema  Vascular: Peripheral pulses palpable    	    	  	  CARDIAC MARKERS:  Labs personally reviewed by me                                  13.3   10.77 )-----------( 191      ( 14 Oct 2024 10:09 )             39.4     10-14    138  |  100  |  21  ----------------------------<  135[H]  4.5   |  25  |  1.53[H]    Ca    10.1      14 Oct 2024 10:09    TPro  7.7  /  Alb  4.7  /  TBili  0.5  /  DBili  x   /  AST  31  /  ALT  28  /  AlkPhos  89  10-14          EKG: Personally reviewed by me - NSR  Radiology: Personally reviewed by me -   < from: Xray Chest 1 View- PORTABLE-Urgent (10.14.24 @ 11:08) >  IMPRESSION:  Clear lungs.          Assessment /Plan:   This is a 74 year old male PMH: CAD s/p stent January 2024 on DAPT, TAVR in May 2024 ([facility]), prostate cancer (last radiation ~1 mo ago), HTN, HLD. Presents to [facility] for chest pain x 1 day. Chest pain started around 3 am, unprovoked. Pain was rated 8/10 and described as an intermittent sharp shocking pain that was non radiating. Denies any known exacerbating or alleviating factors. Associated symptoms were fatigue and diaphoresis. Denies any palpitations, fever, chills, sob, headaches, dizziness, lightheadedness, N/V/D , abd pain or changes in elimination patterns. Admitted for further evaluation.     1. Atypical chest pain  - trops 37->36  - CXR clear lungs  - proBNP 125  - TTE unremarkable  - Pharm nuclear stress test    2. CAD s/p stent (1/2024) TAVR 5/2024  - c/w DAPT, ASA 81mg qd and Plavix 5mg qd    3. HTN  - bp wnl     4, HLD  - check lipids          Differential diagnosis and plan of care discussed with patient after the evaluation. Counseling on diet, nutritional counseling, weight management, exercise and medication compliance was done.   Advanced care planning/advanced directives discussed with patient/family. DNR status including forceful chest compressions to attempt to restart the heart, ventilator support/artificial breathing, electric shock, artificial nutrition, health care proxy, Molst form all discussed with pt. Pt wishes to consider. More than fifteen minutes spent on discussing advanced directives.     Iolani Behrbom, TADEO-NP  Aren Cross DO [facility]  Cardiovascular Medicine  [address] [place] [address] Dr, [address]  Available for call or text via [employer]  Office [phone]

## 2024-10-14 NOTE — ED PROVIDER NOTE - ATTENDING CONTRIBUTION TO CARE
74-year-old male who has a history of hypertension CAD status post multiple stents and prior TAVR presents to the emergency department with left-sided shocklike pains.  Patient nontoxic-appearing awake alert oriented x 3 has clear lungs to auscultation bilaterally he will have imaging and reassessment.  Delta troponin if trending up will likely need heparin.  Patient already received 324 of aspirin earlier today and takes Plavix daily.

## 2024-10-15 ENCOUNTER — TRANSCRIPTION ENCOUNTER (OUTPATIENT)
Age: 74
End: 2024-10-15

## 2024-10-15 VITALS
DIASTOLIC BLOOD PRESSURE: 81 MMHG | OXYGEN SATURATION: 98 % | HEART RATE: 84 BPM | RESPIRATION RATE: 18 BRPM | TEMPERATURE: 98 F | SYSTOLIC BLOOD PRESSURE: 123 MMHG

## 2024-10-15 LAB
A1C WITH ESTIMATED AVERAGE GLUCOSE RESULT: 6 % — HIGH (ref 4–5.6)
ALBUMIN SERPL ELPH-MCNC: 4.5 G/DL — SIGNIFICANT CHANGE UP (ref 3.3–5)
ALP SERPL-CCNC: 87 U/L — SIGNIFICANT CHANGE UP (ref 40–120)
ALT FLD-CCNC: 26 U/L — SIGNIFICANT CHANGE UP (ref 10–45)
ANION GAP SERPL CALC-SCNC: 16 MMOL/L — SIGNIFICANT CHANGE UP (ref 5–17)
AST SERPL-CCNC: 31 U/L — SIGNIFICANT CHANGE UP (ref 10–40)
BASOPHILS # BLD AUTO: 0.02 K/UL — SIGNIFICANT CHANGE UP (ref 0–0.2)
BASOPHILS NFR BLD AUTO: 0.2 % — SIGNIFICANT CHANGE UP (ref 0–2)
BILIRUB SERPL-MCNC: 0.6 MG/DL — SIGNIFICANT CHANGE UP (ref 0.2–1.2)
BUN SERPL-MCNC: 21 MG/DL — SIGNIFICANT CHANGE UP (ref 7–23)
CALCIUM SERPL-MCNC: 9.8 MG/DL — SIGNIFICANT CHANGE UP (ref 8.4–10.5)
CHLORIDE SERPL-SCNC: 101 MMOL/L — SIGNIFICANT CHANGE UP (ref 96–108)
CK MB CFR SERPL CALC: 3.8 NG/ML — SIGNIFICANT CHANGE UP (ref 0–6.7)
CK SERPL-CCNC: 82 U/L — SIGNIFICANT CHANGE UP (ref 30–200)
CO2 SERPL-SCNC: 23 MMOL/L — SIGNIFICANT CHANGE UP (ref 22–31)
CREAT SERPL-MCNC: 1.33 MG/DL — HIGH (ref 0.5–1.3)
EGFR: 55 ML/MIN/1.73M2 — LOW
EOSINOPHIL # BLD AUTO: 0.21 K/UL — SIGNIFICANT CHANGE UP (ref 0–0.5)
EOSINOPHIL NFR BLD AUTO: 2 % — SIGNIFICANT CHANGE UP (ref 0–6)
ESTIMATED AVERAGE GLUCOSE: 126 MG/DL — HIGH (ref 68–114)
GLUCOSE SERPL-MCNC: 129 MG/DL — HIGH (ref 70–99)
HCT VFR BLD CALC: 42 % — SIGNIFICANT CHANGE UP (ref 39–50)
HGB BLD-MCNC: 13.6 G/DL — SIGNIFICANT CHANGE UP (ref 13–17)
IMM GRANULOCYTES NFR BLD AUTO: 0.5 % — SIGNIFICANT CHANGE UP (ref 0–0.9)
LYMPHOCYTES # BLD AUTO: 2.22 K/UL — SIGNIFICANT CHANGE UP (ref 1–3.3)
LYMPHOCYTES # BLD AUTO: 20.8 % — SIGNIFICANT CHANGE UP (ref 13–44)
MCHC RBC-ENTMCNC: 27.9 PG — SIGNIFICANT CHANGE UP (ref 27–34)
MCHC RBC-ENTMCNC: 32.4 GM/DL — SIGNIFICANT CHANGE UP (ref 32–36)
MCV RBC AUTO: 86.1 FL — SIGNIFICANT CHANGE UP (ref 80–100)
MONOCYTES # BLD AUTO: 0.62 K/UL — SIGNIFICANT CHANGE UP (ref 0–0.9)
MONOCYTES NFR BLD AUTO: 5.8 % — SIGNIFICANT CHANGE UP (ref 2–14)
NEUTROPHILS # BLD AUTO: 7.53 K/UL — HIGH (ref 1.8–7.4)
NEUTROPHILS NFR BLD AUTO: 70.7 % — SIGNIFICANT CHANGE UP (ref 43–77)
NRBC # BLD: 0 /100 WBCS — SIGNIFICANT CHANGE UP (ref 0–0)
PLATELET # BLD AUTO: 168 K/UL — SIGNIFICANT CHANGE UP (ref 150–400)
POTASSIUM SERPL-MCNC: 4.1 MMOL/L — SIGNIFICANT CHANGE UP (ref 3.5–5.3)
POTASSIUM SERPL-SCNC: 4.1 MMOL/L — SIGNIFICANT CHANGE UP (ref 3.5–5.3)
PROT SERPL-MCNC: 7.5 G/DL — SIGNIFICANT CHANGE UP (ref 6–8.3)
RBC # BLD: 4.88 M/UL — SIGNIFICANT CHANGE UP (ref 4.2–5.8)
RBC # FLD: 12.5 % — SIGNIFICANT CHANGE UP (ref 10.3–14.5)
SODIUM SERPL-SCNC: 140 MMOL/L — SIGNIFICANT CHANGE UP (ref 135–145)
TROPONIN T, HIGH SENSITIVITY RESULT: 46 NG/L — SIGNIFICANT CHANGE UP (ref 0–51)
WBC # BLD: 10.65 K/UL — HIGH (ref 3.8–10.5)
WBC # FLD AUTO: 10.65 K/UL — HIGH (ref 3.8–10.5)

## 2024-10-15 RX ADMIN — Medication 75 MILLIGRAM(S): at 10:15

## 2024-10-15 RX ADMIN — PANTOPRAZOLE SODIUM 40 MILLIGRAM(S): 40 TABLET, DELAYED RELEASE ORAL at 05:29

## 2024-10-15 RX ADMIN — LIDOCAINE 1 PATCH: 50 CREAM TOPICAL at 07:28

## 2024-10-15 RX ADMIN — LIDOCAINE 1 PATCH: 50 CREAM TOPICAL at 13:30

## 2024-10-15 NOTE — DISCHARGE NOTE PROVIDER - HOSPITAL COURSE
73yo M PMH: CAD s/p stent January 2024 on DAPT, TAVR in May 2024 ([facility]), prostate cancer (last radiation ~1 mo ago), HTN, HLD. Presents to [facility] for chest pain x 1 day, trop flat, no etopy on tele, Echo reviewed. Patient reports he remembered that he took caffeine pill the day of his symptoms, thinks it may have contributed.  He is feeling better today.  Could not tolerate stress testing due to claustrophobia.  Discussed med management vs. stress echo vs. cardiac catheterization.  Patient's preference is to monitor symptoms at home and outpatient follow-up.  Alarm symptoms reviewed and patient will return and/or call with any new or worsened symptoms. Pt also with an DARIUS on admission, improved s/p IVF.

## 2024-10-15 NOTE — CONSULT NOTE ADULT - ASSESSMENT
Patient is a 75 YO male who presents to the hospital with an acute exacerbation of chest pain.    #atypical chest pain  - patient chest pain free for the entire time spent at bedside, atypical in nature (non-exertional, sharp/shocking feeling, positional/worse when he lays on his left side)  - continue telemetry monitor  - EKG without acute ischemic changes  - troponin negative x 2  - continue ASA 81mg QD + plavix 75 mg QD  - Recommend TTE to evaluate cardiac and valve function/anatomy  - Agree with stress testing to evaluate for ischemia, would defer invasive evaluation for now    ____________  Nelson Muniz MD  [facility]
74y old Male with history of Pre-diabetes, CAD, TAVR and Prostate cancer here with chest pain.    1. Pre-diabetes  - explained to him that his a1c for many years has been in the pre-diabetes range and overall stable  - He does not require any medication at this time  - It can be followed every 6 months through his PCP or if he wishes to establish care with Dr. Ham again he may do so  - No need for medications on discharge    2. CAD  3. Chest pain  - outpatient follow-up  - no plans for stress test inpatient    Anitha Yang MD  [facility]- Division of Endocrinology    [address] [place] [address]    T [phone]  F [phone]

## 2024-10-15 NOTE — DISCHARGE NOTE PROVIDER - NSDCCPCAREPLAN_GEN_ALL_CORE_FT
PRINCIPAL DISCHARGE DIAGNOSIS  Diagnosis: Chest pain  Assessment and Plan of Treatment: Chest pain resolved  Pt Could not tolerate stress testing due to claustrophobia.  Cardiology discussed med management vs. stress echo vs. cardiac catheterization.  Patient's preference is to monitor symptoms at home and outpatient follow-up.  Alarm symptoms reviewed and patient will return and/or call cardiologist with any new or worsened symptoms.  HOME CARE INSTRUCTIONS  For the next few days, avoid physical activities that bring on chest pain. Continue physical activities as directed.  Do not smoke.  Avoid drinking alcohol.   Only take over-the-counter or prescription medicine for pain, discomfort, or fever as directed by your caregiver.  Follow your caregiver's suggestions for further testing if your chest pain does not go away.  Keep any follow-up appointments you made. If you do not go to an appointment, you could develop lasting (chronic) problems with pain. If there is any problem keeping an appointment, you must call to reschedule.   SEEK MEDICAL CARE IF:  You think you are having problems from the medicine you are taking. Read your medicine instructions carefully.  Your chest pain does not go away, even after treatment.  You develop a rash with blisters on your chest.  SEEK IMMEDIATE MEDICAL CARE IF:  You have increased chest pain or pain that spreads to your arm, neck, jaw, back, or abdomen.   You develop shortness of breath, an increasing cough, or you are coughing up blood.  You have severe back or abdominal pain, feel nauseous, or vomit.  You develop severe weakness, fainting, or chills.  You have a fever.  THIS IS AN EMERGENCY. Do not wait to see if the pain will go away. Get medical help at once. Call your local emergency services

## 2024-10-15 NOTE — PROGRESS NOTE ADULT - ASSESSMENT
75yo M PMH: CAD s/p stent January 2024 on DAPT, TAVR in May 2024 ([facility]), prostate cancer (last radiation ~1 mo ago), HTN, HLD. Presents to [facility] for chest pain x 1 day.    # atypical CP  # S/p TAVR/ CAD/ HTN/ HLD  - trop flat  - Monitor on tele  - Echo reviewed  - Pharm nuclear stress test  - cardio following  - cont asa plavix statin    # DARIUS  - improving with IVF    # elevated a1c  - endo consult called    dispo dc pending stress test    [facility]   73yo M PMH: CAD s/p stent January 2024 on DAPT, TAVR in May 2024 ([facility]), prostate cancer (last radiation ~1 mo ago), HTN, HLD. Presents to [facility] for chest pain x 1 day.    # atypical CP  # S/p TAVR/ CAD/ HTN/ HLD  - trop flat  - Monitor on tele  - Echo reviewed  - pt could not perform Pharm nuclear stress test, will fu outpt with Dr Muniz  - bharti cardio and agrees with plan  - cont asa plavix statin    # DARIUS  - improved    # elevated a1c  - endo consult called    dispo dc home and outpt fu    [facility]  [phone]

## 2024-10-15 NOTE — DISCHARGE NOTE PROVIDER - NSDCFUSCHEDAPPT_GEN_ALL_CORE_FT
[facility]  DIAGRAD [address]  Scheduled Appointment: 10/31/2024    [facility]  DIAGR [address]  Scheduled Appointment: 10/31/2024

## 2024-10-15 NOTE — PROGRESS NOTE ADULT - TIME BILLING
Face to face time with patient discussing case and performing examination.  Review of hospital records including independent review of labs/imaging/consultant notes.  Discussion of care plan with consulting services and outpatient physicians.  Coordination of care.

## 2024-10-15 NOTE — PATIENT PROFILE ADULT - FALL HARM RISK - ATTEMPT OOB
Patient was mailed instructions for Colon IV sed diabetic  on 12/17/20  at 9:30 am  at [facility].    Covid test scheduled on 12/14/20 at  1:00 p.m.  [facility] Registration    Patient aware that he will need to be quarantined 3 days prior to procedure.    NOTE:   Patient would not listen kept on talking.   Patient stated that his friend did his procedure with out prepping.    Patient also stated that he needs to leave and get food because he doesn't cook & eats out.      No

## 2024-10-15 NOTE — DISCHARGE NOTE NURSING/CASE MANAGEMENT/SOCIAL WORK - PATIENT PORTAL LINK FT
You can access the FollowMyHealth Patient Portal offered by [facility] by registering at the following website: [email or web address]. By joining [employer]’s FollowMyHealth portal, you will also be able to view your health information using other applications (apps) compatible with our system.

## 2024-10-15 NOTE — PATIENT PROFILE ADULT - NSPROPASSIVESMOKEEXPOSURE_GEN_A_NUR
CT CAP resulted (prelim):  < from: CT Chest/Abdomen Pelvis No Cont (07.25.23 @ 23:57) >    FINDINGS:  Lungs: 1.8 x 1.2 cm right upper lobe pulmonary nodule.  Pleural spaces: Unremarkable. No pneumothorax. No pleural effusion.  Heart: Unremarkable. No cardiomegaly. No pericardial effusion.  Lymph nodes: Unremarkable. No enlarged lymph nodes.  Vasculature: Unremarkable. No aortic aneurysm.    Liver: Low-attenuation of the liver is compatible with hepatic steatosis.  Gallbladder and bile ducts: There is a stone in the gallbladder.   Gallbladder  wall is thickened with surrounding stranding.  Bones/joints: Unremarkable. No acute fracture.  Soft tissues: Unremarkable.    IMPRESSION:  1.   No acute intrathoracic findings.  2.   Cholelithiasis with gallbladder wall thickening and pericholecystic   fluid  compatible with acute cholecystitis.  3.   Hepatic steatosis.  4.   1.8 cm right upper lobe pulmonary nodule. Consider PET CT for further  evaluation. Defer to on-site radiologist for ultimate follow-up   recommendations  for this finding.    < end of copied text >    -------------------------------------------------------------------------------------------------------------------  Radiology results reviewed. Clinically, patient does not have evidence of cholecystitis on exam. She has no pain with eating or abdominal tenderness to palpation. However, she does have an unexplained leukocytosis and an episode of feeling unwell with emesis 2 days ago. Surgery consulted. Will keep NPO for now. Continue IVF. Will start IV zosyn after blood cultures are drawn. FU surgery recs. CT CAP resulted (prelim):  < from: CT Chest/Abdomen Pelvis No Cont (07.25.23 @ 23:57) >    FINDINGS:  Lungs: 1.8 x 1.2 cm right upper lobe pulmonary nodule.  Pleural spaces: Unremarkable. No pneumothorax. No pleural effusion.  Heart: Unremarkable. No cardiomegaly. No pericardial effusion.  Lymph nodes: Unremarkable. No enlarged lymph nodes.  Vasculature: Unremarkable. No aortic aneurysm.    Liver: Low-attenuation of the liver is compatible with hepatic steatosis.  Gallbladder and bile ducts: There is a stone in the gallbladder.   Gallbladder  wall is thickened with surrounding stranding.  Bones/joints: Unremarkable. No acute fracture.  Soft tissues: Unremarkable.    IMPRESSION:  1.   No acute intrathoracic findings.  2.   Cholelithiasis with gallbladder wall thickening and pericholecystic   fluid  compatible with acute cholecystitis.  3.   Hepatic steatosis.  4.   1.8 cm right upper lobe pulmonary nodule. Consider PET CT for further  evaluation. Defer to on-site radiologist for ultimate follow-up   recommendations  for this finding.    < end of copied text >    -------------------------------------------------------------------------------------------------------------------  Radiology results reviewed. Clinically, patient does not have evidence of cholecystitis on exam. She has no pain with eating or abdominal tenderness to palpation. However, she does have an unexplained leukocytosis and an episode of feeling unwell with emesis 2 days ago. Surgery consulted. Will keep NPO for now. Continue IVF. Will start IV zosyn after blood cultures are drawn. FU surgery recs.    -------------------------------------------------------------------------------------------------------------------  Additional NS bolus and zosyn ordered for sepsis coverage. Cultures and lactate drawn. Spoke with patient re: CT findings of possible cholecystitis and RUL nodule. Surgery to see patient. Patient to follow up outpatient for the 1.8cm lung nodule. CT CAP resulted (prelim):  < from: CT Chest/Abdomen Pelvis No Cont (07.25.23 @ 23:57) >    FINDINGS:  Lungs: 1.8 x 1.2 cm right upper lobe pulmonary nodule.  Pleural spaces: Unremarkable. No pneumothorax. No pleural effusion.  Heart: Unremarkable. No cardiomegaly. No pericardial effusion.  Lymph nodes: Unremarkable. No enlarged lymph nodes.  Vasculature: Unremarkable. No aortic aneurysm.    Liver: Low-attenuation of the liver is compatible with hepatic steatosis.  Gallbladder and bile ducts: There is a stone in the gallbladder.   Gallbladder  wall is thickened with surrounding stranding.  Bones/joints: Unremarkable. No acute fracture.  Soft tissues: Unremarkable.    IMPRESSION:  1.   No acute intrathoracic findings.  2.   Cholelithiasis with gallbladder wall thickening and pericholecystic   fluid  compatible with acute cholecystitis.  3.   Hepatic steatosis.  4.   1.8 cm right upper lobe pulmonary nodule. Consider PET CT for further  evaluation. Defer to on-site radiologist for ultimate follow-up   recommendations  for this finding.    < end of copied text >    -------------------------------------------------------------------------------------------------------------------  Radiology results reviewed. Clinically, patient does not have evidence of cholecystitis on exam. She has no pain with eating or abdominal tenderness to palpation. However, she does have an unexplained leukocytosis and an episode of feeling unwell with emesis 2 days ago. Surgery consulted. Will keep NPO for now. Continue IVF. Will start IV zosyn after blood cultures are drawn. FU surgery recs.    -------------------------------------------------------------------------------------------------------------------  Additional NS bolus and zosyn ordered for sepsis coverage. Cultures and lactate drawn. Spoke with patient re: CT findings of possible cholecystitis and RUL nodule. Surgery to see patient. Patient to follow up outpatient for the 1.8cm lung nodule.    -------------------------------------------------------------------------------------------------------------------  Called and updated daughter in law Queen as well. She is in agreement with the plan.    Of note, also reviewed repeat EKG - Ischemic changes have improved (as has the heart rate). QT is prolonged. Will repeat later today. Unknown

## 2024-10-15 NOTE — PROGRESS NOTE ADULT - SUBJECTIVE AND OBJECTIVE BOX
Patient seen and examined at bedside.  CP free overnight.  Troponin and echo WNL, reviewed with patient.      Patient reports he remembered that he took caffeine pill the day of his symptoms, thinks it may have contributed.  He is feeling better today.  Could not tolerate stress testing due to claustrophobia.  Discussed med management vs. stress echo vs. cardiac catheterization.  Patient's preference is to monitor symptoms at home and outpatient follow-up.  Alarm symptoms reviewed and patient will return and/or call with any new or worsened symptoms.    PMHx:   Aortic stenosis    GERD (gastroesophageal reflux disease)    Prostate CA    DARIUS (acute kidney injury)    CAD (coronary artery disease)    HLD (hyperlipidemia)    Former smoker        PSHx:   History of cardiac cath    History of prostate surgery    Previous back surgery    H/O hernia repair    S/P cholecystectomy    H/O nasal septoplasty    H/O neck surgery        Allergies:  statins (Muscle Pain; Joint Pain)      Home Meds: As per admission medication reconciliation     Current Medications:   sodium chloride 0.9%. 1000 milliLiter(s) IV Continuous <Continuous>      FAMILY HISTORY:      Social History:    REVIEW OF SYSTEMS:  Constitutional:     [x ] negative [ ] fevers [ ] chills [ ] weight loss [ ] weight gain  HEENT:                  [x ] negative [ ] dry eyes [ ] eye irritation [ ] postnasal drip [ ] nasal congestion  CV:                         [ x] negative  [ ] chest pain [ ] orthopnea [ ] palpitations [ ] murmur  Resp:                     [x ] negative [ ] cough [ ] shortness of breath [ ] dyspnea [ ] wheezing [ ] sputum [ ]hemoptysis  GI:                          [ x] negative [ ] nausea [ ] vomiting [ ] diarrhea [ ] constipation [ ] abd pain [ ] dysphagia   :                        [ x] negative [ ] dysuria [ ] nocturia [ ] hematuria [ ] increased urinary frequency  Musculoskeletal: [x ] negative [ ] back pain [ ] myalgias [ ] arthralgias [ ] fracture  Skin:                       [ x] negative [ ] rash [ ] itch  Neurological:        [ x] negative [ ] headache [ ] dizziness [ ] syncope [ ] weakness [ ] numbness  Psychiatric:           [ x] negative [ ] anxiety [ ] depression  Endocrine:            [ x] negative [ ] diabetes [ ] thyroid problem  Heme/Lymph:      [ x] negative [ ] anemia [ ] bleeding problem  Allergic/Immune: [ x] negative [ ] itchy eyes [ ] nasal discharge [ ] hives [ ] angioedema    [ x] All other systems negative  [ ] Unable to assess ROS due to      Physical Exam:  T(F): 99.5 (10-14), Max: 99.5 (10-14)  HR: 62 (10-14) (62 - 72)  BP: 121/75 (10-14) (121/75 - 139/80)  RR: 18 (10-14)  SpO2: 96% (10-14)  General: Alert, no acute distress, appears comfortable   HEENT: No scleral icterus, EOMI, no facial dysmorphia, no external ear lesions   Cardiac: Regular rate and rhythm, no murmurs, no rubs, no gallops   Pulmonary: Clear breath sounds throughout, no wheezing, no stridor, no crackles   Abdomen: Nondistended, nontender, appears soft   Skin: no obvious rash or lesions   Extremities: no LE edema  Neurological: Moving all 4 extremities, no overt focal deficits noted   Psych: normal mood and affect     Cardiovascular Diagnostic Testing:    ECG: 10/14/24  NORMAL SINUS RHYTHM  RIGHT BUNDLE BRANCH BLOCK  LEFT ANTERIOR FASCICULAR BLOCK  *** BIFASCICULAR BLOCK ***    CXR: 10/14/24  WNL    ECHO: 10/14/24  1. Left ventricular cavity is normal in size. Left ventricular wall thickness is mildly increased. Left ventricular systolic function is normal with an ejection fraction of 61 % by Pepe's method of disks. There are no regional wall motion abnormalities seen.   2. Normal right ventricular cavity size and normal right ventricular systolic function.   3. Estimated pulmonary artery systolic pressure is 31 mmHg.   4. A 26 mm DAVID 3 Ultra RESILIA (TAVR) is present in the aortic position. Implanted 5/29/2024. The prosthetic valve is well seated with normal function. There is no intravalvular regurgitation. There is no paravalvular regurgitation. The peak transaortic velocity is 2.56 m/s, peak transaortic gradient is 26.2 mmHg and mean transaortic gradient is 14.0 mmHg with an LVOT/aortic valve VTI ratio of 0.47. The aortic valve area is estimated at 2.51 cm² by the continuity equation.   5. No pericardial effusion seen.   6. Compared to the transthoracic echocardiogram performed on 7/18/2024, there have been no significant interval changes.      Labs: Personally reviewed                        13.3   10.77 )-----------( 191      ( 14 Oct 2024 10:09 )             39.4     10-14    138  |  100  |  21  ----------------------------<  135[H]  4.5   |  25  |  1.53[H]    Ca    10.1      14 Oct 2024 10:09    TPro  7.7  /  Alb  4.7  /  TBili  0.5  /  DBili  x   /  AST  31  /  ALT  28  /  AlkPhos  89  10-14    PT/INR - ( 14 Oct 2024 10:09 )   PT: 12.3 sec;   INR: 1.08 ratio         PTT - ( 14 Oct 2024 10:09 )  PTT:30.1 sec

## 2024-10-15 NOTE — PROGRESS NOTE ADULT - ASSESSMENT
Patient is a 73 YO male who presents to the hospital with an acute exacerbation of chest pain.    #atypical chest pain  - patient chest pain free for the entire time spent at bedside, atypical in nature (non-exertional, sharp/shocking feeling, positional/worse when he lays on his left side)  - telemetry monitoring unrevealing  - EKG without acute ischemic changes  - troponin negative x 3  - echo as above, valve operating as intended  - continue ASA 81mg QD + plavix 75 mg QD  - unable to tolerate nuclear stress testing.  Discussed risks/benefits/alternatives to stress echo, cardiac catheterization, coronary CTA, and medical management/surveillance of symptoms.  Patient's preference is to monitor his symptoms.  - red flag symptoms of worsening chest pain, SOB, weight gain, syncope/pre-syncope discussed in detail and patient will return to ED +/- call our office should these occur  - follow-up in interventional/structural cardiology clinic in 2 weeks' for follow-up, this appointment has been requested    Interventional/Structural Cardiology Service will sign off at this time.  Please recall our service for any questions or concerns.    D/w rounding cardiology - Dr. Cross and outpatient cardiologist - Dr. Ryder.    ____________  Nelson Muniz MD  [facility]

## 2024-10-15 NOTE — PROGRESS NOTE ADULT - SUBJECTIVE AND OBJECTIVE BOX
DATE OF SERVICE: 10-15-24 @ 13:34    Patient is a 74y old  Male who presents with a chief complaint of Chest pain (15 Oct 2024 10:50)      INTERVAL HISTORY: Feels ok. Anxious about stress test.     REVIEW OF SYSTEMS:  CONSTITUTIONAL: No weakness  EYES/ENT: No visual changes;  No throat pain   NECK: No pain or stiffness  RESPIRATORY: No cough, wheezing; No shortness of breath  CARDIOVASCULAR: No chest pain or palpitations  GASTROINTESTINAL: No abdominal  pain. No nausea, vomiting, or hematemesis  GENITOURINARY: No dysuria, frequency or hematuria  NEUROLOGICAL: No stroke like symptoms  SKIN: No rashes    TELEMETRY Personally reviewed:  SB/SR with occ PVCs, Bigeminy, trigeminy  	  MEDICATIONS:        PHYSICAL EXAM:  T(C): 36.8 (10-15-24 @ 12:06), Max: 37 (10-14-24 @ 19:34)  HR: 84 (10-15-24 @ 12:06) (63 - 98)  BP: 123/81 (10-15-24 @ 12:06) (123/81 - 145/84)  RR: 18 (10-15-24 @ 12:06) (18 - 18)  SpO2: 98% (10-15-24 @ 12:06) (95% - 98%)  Wt(kg): --  I&O's Summary        Appearance: In no distress	  HEENT:    PERRL, EOMI	  Cardiovascular:  S1 S2, No JVD  Respiratory: Lungs clear to auscultation	  Gastrointestinal:  Soft, Non-tender, + BS	  Vascularature:  No edema of LE  Psychiatric: Appropriate affect   Neuro: no acute focal deficits                               13.6   10.65 )-----------( 168      ( 15 Oct 2024 07:10 )             42.0     10-15    140  |  101  |  21  ----------------------------<  129[H]  4.1   |  23  |  1.33[H]    Ca    9.8      15 Oct 2024 07:10    TPro  7.5  /  Alb  4.5  /  TBili  0.6  /  DBili  x   /  AST  31  /  ALT  26  /  AlkPhos  87  10-15        Labs personally reviewed      ASSESSMENT/PLAN: 	    This is a 74 year old male PMH: CAD s/p stent January 2024 on DAPT, TAVR in May 2024 ([facility] Cristy), prostate cancer (last radiation ~1 mo ago), HTN, HLD. Presents to [facility] for chest pain x 1 day. Chest pain started around 3 am, unprovoked. Pain was rated 8/10 and described as an intermittent sharp shocking pain that was non radiating. Denies any known exacerbating or alleviating factors. Associated symptoms were fatigue and diaphoresis. Denies any palpitations, fever, chills, sob, headaches, dizziness, lightheadedness, N/V/D , abd pain or changes in elimination patterns. Admitted for further evaluation.     1. Atypical chest pain  - trops 37->36  - CXR clear lungs  - proBNP 125  - TTE unremarkable  - Pharm nuclear stress test (Patient declined 10/15 d/t claustrophobia but agreed to undergo tomorrow with pre-medication with Valium)    2. CAD s/p stent (1/2024) TAVR 5/2024  - c/w DAPT, ASA 81mg qd and Plavix 5mg qd  - Plan for NST as noted above    3. HTN  - bp wnl     4, HLD  - check lipids          Katelin Stokes, TADEO-NP   Aren Cross DO [facility]  Cardiovascular Medicine  [address], [address]  Available through call or text on [employer]  Office: [phone]

## 2024-10-15 NOTE — PROGRESS NOTE ADULT - SUBJECTIVE AND OBJECTIVE BOX
Patient is a 74y old  Male who presents with a chief complaint of Chest pain (14 Oct 2024 17:07)      SUBJECTIVE / OVERNIGHT EVENTS:    Patient seen and examined. no complaints. pending stress test.      Vital Signs Last 24 Hrs  T(C): 36.5 (15 Oct 2024 04:46), Max: 37.5 (14 Oct 2024 12:50)  T(F): 97.7 (15 Oct 2024 04:46), Max: 99.5 (14 Oct 2024 12:50)  HR: 63 (15 Oct 2024 04:46) (62 - 98)  BP: 133/74 (15 Oct 2024 04:46) (121/75 - 145/84)  BP(mean): 92 (14 Oct 2024 11:51) (92 - 92)  RR: 18 (15 Oct 2024 04:46) (18 - 18)  SpO2: 98% (15 Oct 2024 04:46) (95% - 98%)    Parameters below as of 15 Oct 2024 04:46  Patient On (Oxygen Delivery Method): room air      I&O's Summary      PE:  GENERAL: NAD, AAOx3  CHEST/LUNG: CTABL, No wheeze  HEART: Regular rate and rhythm; no murmur  ABDOMEN: Soft, Nontender, Nondistended; Bowel sounds present  EXTREMITIES:  2+ Peripheral Pulses, No edema  NEURO: No focal deficits    LABS:                        13.6   10.65 )-----------( 168      ( 15 Oct 2024 07:10 )             42.0     10-15    140  |  101  |  21  ----------------------------<  129[H]  4.1   |  23  |  1.33[H]    Ca    9.8      15 Oct 2024 07:10    TPro  7.5  /  Alb  4.5  /  TBili  0.6  /  DBili  x   /  AST  31  /  ALT  26  /  AlkPhos  87  10-15    PT/INR - ( 14 Oct 2024 10:09 )   PT: 12.3 sec;   INR: 1.08 ratio         PTT - ( 14 Oct 2024 10:09 )  PTT:30.1 sec  CAPILLARY BLOOD GLUCOSE        CARDIAC MARKERS ( 14 Oct 2024 23:53 )  x     / x     / x     / x     / 3.8 ng/mL      Urinalysis Basic - ( 15 Oct 2024 07:10 )    Color: x / Appearance: x / SG: x / pH: x  Gluc: 129 mg/dL / Ketone: x  / Bili: x / Urobili: x   Blood: x / Protein: x / Nitrite: x   Leuk Esterase: x / RBC: x / WBC x   Sq Epi: x / Non Sq Epi: x / Bacteria: x        RADIOLOGY & ADDITIONAL TESTS:    Imaging Personally Reviewed:  [x] YES  [ ] NO    Consultant(s) Notes Reviewed:  [x] YES  [ ] NO    MEDICATIONS  (STANDING):  aspirin enteric coated 81 milliGRAM(s) Oral daily  clopidogrel Tablet 75 milliGRAM(s) Oral daily  fentaNYL   Patch 100 MICROgram(s)/Hr. 1 Patch Transdermal every 48 hours  lidocaine   4% Patch 1 Patch Transdermal daily  pantoprazole    Tablet 40 milliGRAM(s) Oral before breakfast  sodium chloride 0.9%. 1000 milliLiter(s) (75 mL/Hr) IV Continuous <Continuous>  tamsulosin 0.4 milliGRAM(s) Oral at bedtime    MEDICATIONS  (PRN):      Care Discussed with Consultants/Other Providers [x] YES  [ ] NO    HEALTH ISSUES - PROBLEM Dx:       Patient is a 74y old  Male who presents with a chief complaint of Chest pain (14 Oct 2024 17:07)      SUBJECTIVE / OVERNIGHT EVENTS:    Patient seen and examined. pt returned from stress test and unable to perform test. pt states he is anxious and constipated. pt states he cannot guarantee that he can complete the test. pt states ativan and valium and anti anxiety pills d not work for him. pt notes dilaudid 2mg IV makes him sleepy. pt denies active cp. states he remembers taking half a "caffeine pill" that morning the CP started.      Vital Signs Last 24 Hrs  T(C): 36.5 (15 Oct 2024 04:46), Max: 37.5 (14 Oct 2024 12:50)  T(F): 97.7 (15 Oct 2024 04:46), Max: 99.5 (14 Oct 2024 12:50)  HR: 63 (15 Oct 2024 04:46) (62 - 98)  BP: 133/74 (15 Oct 2024 04:46) (121/75 - 145/84)  BP(mean): 92 (14 Oct 2024 11:51) (92 - 92)  RR: 18 (15 Oct 2024 04:46) (18 - 18)  SpO2: 98% (15 Oct 2024 04:46) (95% - 98%)    Parameters below as of 15 Oct 2024 04:46  Patient On (Oxygen Delivery Method): room air      I&O's Summary      PE:  GENERAL: NAD, AAOx3  CHEST/LUNG: CTABL, No wheeze  HEART: Regular rate and rhythm; no murmur  ABDOMEN: Soft, Nontender, Nondistended; Bowel sounds present  EXTREMITIES:  2+ Peripheral Pulses, No edema  NEURO: No focal deficits    LABS:                        13.6   10.65 )-----------( 168      ( 15 Oct 2024 07:10 )             42.0     10-15    140  |  101  |  21  ----------------------------<  129[H]  4.1   |  23  |  1.33[H]    Ca    9.8      15 Oct 2024 07:10    TPro  7.5  /  Alb  4.5  /  TBili  0.6  /  DBili  x   /  AST  31  /  ALT  26  /  AlkPhos  87  10-15    PT/INR - ( 14 Oct 2024 10:09 )   PT: 12.3 sec;   INR: 1.08 ratio         PTT - ( 14 Oct 2024 10:09 )  PTT:30.1 sec  CAPILLARY BLOOD GLUCOSE        CARDIAC MARKERS ( 14 Oct 2024 23:53 )  x     / x     / x     / x     / 3.8 ng/mL      Urinalysis Basic - ( 15 Oct 2024 07:10 )    Color: x / Appearance: x / SG: x / pH: x  Gluc: 129 mg/dL / Ketone: x  / Bili: x / Urobili: x   Blood: x / Protein: x / Nitrite: x   Leuk Esterase: x / RBC: x / WBC x   Sq Epi: x / Non Sq Epi: x / Bacteria: x        RADIOLOGY & ADDITIONAL TESTS:    Imaging Personally Reviewed:  [x] YES  [ ] NO    Consultant(s) Notes Reviewed:  [x] YES  [ ] NO    MEDICATIONS  (STANDING):  aspirin enteric coated 81 milliGRAM(s) Oral daily  clopidogrel Tablet 75 milliGRAM(s) Oral daily  fentaNYL   Patch 100 MICROgram(s)/Hr. 1 Patch Transdermal every 48 hours  lidocaine   4% Patch 1 Patch Transdermal daily  pantoprazole    Tablet 40 milliGRAM(s) Oral before breakfast  sodium chloride 0.9%. 1000 milliLiter(s) (75 mL/Hr) IV Continuous <Continuous>  tamsulosin 0.4 milliGRAM(s) Oral at bedtime    MEDICATIONS  (PRN):      Care Discussed with Consultants/Other Providers [x] YES  [ ] NO    HEALTH ISSUES - PROBLEM Dx:

## 2024-10-15 NOTE — CONSULT NOTE ADULT - SUBJECTIVE AND OBJECTIVE BOX
[facility] Initial Consult Note    HPI  74y old Male with history of Pre-diabetes, CAD, TAVR and Prostate cancer here with chest pain.    History was obtained from patient and outpatient/inpatient chart review.    He reports he had T2DM about 15 years ago and was on Janumet for a period of time. He lost a lot of weight and was able to come off of it. He was following with Dr. Rangel Ham at [facility] but hasn't seen him in a while due to heart issues. Review of his outpatient records shows that his a1c has ranged anywhere from 5.7 to 6.1% over the last several years. He is not on any medication. Follows with multiple providers at [facility] including Dr. Muniz, Dr. Genao.    Vital Signs Last 24 Hrs  T(C): 36.8 (10-15-24 @ 12:06), Max: 37 (10-14-24 @ 19:34)  HR: 84 (10-15-24 @ 12:06) (63 - 98)  BP: 123/81 (10-15-24 @ 12:06) (123/81 - 145/84)  RR: 18 (10-15-24 @ 12:06) (18 - 18)  SpO2: 98% (10-15-24 @ 12:06) (95% - 98%)    Physical Exam  Gen: NAD, alert, awake  HEENT: NC/AT, moist mucous membranes  Chest: normal respiratory effort  Heart: +S1 S2  Neuro: normal mood and affect    Medications  aspirin enteric coated 81 milliGRAM(s) Oral daily  clopidogrel Tablet 75 milliGRAM(s) Oral daily  fentaNYL   Patch 100 MICROgram(s)/Hr. 1 Patch Transdermal every 48 hours  lidocaine   4% Patch 1 Patch Transdermal daily  pantoprazole    Tablet 40 milliGRAM(s) Oral before breakfast  sodium chloride 0.9%. 1000 milliLiter(s) IV Continuous <Continuous>  tamsulosin 0.4 milliGRAM(s) Oral at bedtime    Pertinent labs/imaging    eGFR: 55 mL/min/1.73m2 (10-15-24 @ 07:10)  eGFR: 47 mL/min/1.73m2 (10-14-24 @ 10:09)

## 2024-10-15 NOTE — DISCHARGE NOTE PROVIDER - NSDCMRMEDTOKEN_GEN_ALL_CORE_FT
aspirin 81 mg oral tablet: 1 tab(s) orally once a day  clopidogrel 75 mg oral tablet: 1 tab(s) orally once a day  Duragesic-100 transdermal film, extended release: 1 patch transdermally every other day  Leqvio 284 mg/1.5 mL subcutaneous solution: 284 milligram(s) subcutaneously every 6 months  Lidoderm 5% topical film: 1 to 3 patches applied topically every 12 hours on and 12 hours off  pantoprazole 40 mg oral delayed release tablet: 1 tab(s) orally once a day  silodosin 8 mg oral capsule: 1 cap(s) orally once a day

## 2024-10-31 ENCOUNTER — APPOINTMENT (OUTPATIENT)
Dept: RADIOLOGY | Facility: HOSPITAL | Age: 74
End: 2024-10-31

## 2024-10-31 ENCOUNTER — OUTPATIENT (OUTPATIENT)
Dept: OUTPATIENT SERVICES | Facility: HOSPITAL | Age: 74
LOS: 1 days | Discharge: HOME | End: 2024-10-31

## 2024-10-31 ENCOUNTER — APPOINTMENT (OUTPATIENT)
Dept: SPEECH THERAPY | Facility: HOSPITAL | Age: 74
End: 2024-10-31

## 2024-10-31 ENCOUNTER — OUTPATIENT (OUTPATIENT)
Dept: OUTPATIENT SERVICES | Facility: HOSPITAL | Age: 74
LOS: 1 days | End: 2024-10-31

## 2024-10-31 DIAGNOSIS — Z98.890 OTHER SPECIFIED POSTPROCEDURAL STATES: Chronic | ICD-10-CM

## 2024-10-31 DIAGNOSIS — Z90.49 ACQUIRED ABSENCE OF OTHER SPECIFIED PARTS OF DIGESTIVE TRACT: Chronic | ICD-10-CM

## 2024-10-31 DIAGNOSIS — R13.10 DYSPHAGIA, UNSPECIFIED: ICD-10-CM

## 2024-10-31 PROCEDURE — 74230 X-RAY XM SWLNG FUNCJ C+: CPT | Mod: 26

## 2024-11-05 ENCOUNTER — APPOINTMENT (OUTPATIENT)
Dept: OTOLARYNGOLOGY | Facility: CLINIC | Age: 74
End: 2024-11-05
Payer: MEDICARE

## 2024-11-05 PROCEDURE — 92507 TX SP LANG VOICE COMM INDIV: CPT | Mod: GN

## 2024-11-14 DIAGNOSIS — R13.12 DYSPHAGIA, OROPHARYNGEAL PHASE: ICD-10-CM

## 2024-11-18 ENCOUNTER — APPOINTMENT (OUTPATIENT)
Dept: OTOLARYNGOLOGY | Facility: CLINIC | Age: 74
End: 2024-11-18
Payer: MEDICARE

## 2024-11-18 PROCEDURE — 92526 ORAL FUNCTION THERAPY: CPT | Mod: GN

## 2024-11-26 PROCEDURE — 80053 COMPREHEN METABOLIC PANEL: CPT

## 2024-11-26 PROCEDURE — 85730 THROMBOPLASTIN TIME PARTIAL: CPT

## 2024-11-26 PROCEDURE — 85610 PROTHROMBIN TIME: CPT

## 2024-11-26 PROCEDURE — 82553 CREATINE MB FRACTION: CPT

## 2024-11-26 PROCEDURE — 83036 HEMOGLOBIN GLYCOSYLATED A1C: CPT

## 2024-11-26 PROCEDURE — 82550 ASSAY OF CK (CPK): CPT

## 2024-11-26 PROCEDURE — 93306 TTE W/DOPPLER COMPLETE: CPT

## 2024-11-26 PROCEDURE — 83880 ASSAY OF NATRIURETIC PEPTIDE: CPT

## 2024-11-26 PROCEDURE — 85025 COMPLETE CBC W/AUTO DIFF WBC: CPT

## 2024-11-26 PROCEDURE — 85379 FIBRIN DEGRADATION QUANT: CPT

## 2024-11-26 PROCEDURE — 99285 EMERGENCY DEPT VISIT HI MDM: CPT | Mod: 25

## 2024-11-26 PROCEDURE — 71045 X-RAY EXAM CHEST 1 VIEW: CPT

## 2024-11-26 PROCEDURE — 84484 ASSAY OF TROPONIN QUANT: CPT

## 2024-12-05 ENCOUNTER — APPOINTMENT (OUTPATIENT)
Dept: OTOLARYNGOLOGY | Facility: CLINIC | Age: 74
End: 2024-12-05
Payer: MEDICARE

## 2024-12-05 PROCEDURE — 92526 ORAL FUNCTION THERAPY: CPT | Mod: GN

## 2024-12-12 ENCOUNTER — APPOINTMENT (OUTPATIENT)
Dept: OTOLARYNGOLOGY | Facility: CLINIC | Age: 74
End: 2024-12-12

## 2024-12-12 PROCEDURE — 92526 ORAL FUNCTION THERAPY: CPT | Mod: GN

## 2025-01-21 ENCOUNTER — NON-APPOINTMENT (OUTPATIENT)
Age: 75
End: 2025-01-21

## 2025-01-21 ENCOUNTER — APPOINTMENT (OUTPATIENT)
Dept: CARDIOLOGY | Facility: CLINIC | Age: 75
End: 2025-01-21

## 2025-03-03 ENCOUNTER — APPOINTMENT (OUTPATIENT)
Dept: CARDIOLOGY | Facility: CLINIC | Age: 75
End: 2025-03-03

## 2025-03-05 ENCOUNTER — APPOINTMENT (OUTPATIENT)
Dept: CARDIOLOGY | Facility: CLINIC | Age: 75
End: 2025-03-05

## 2025-03-06 ENCOUNTER — APPOINTMENT (OUTPATIENT)
Dept: CARDIOLOGY | Facility: CLINIC | Age: 75
End: 2025-03-06

## 2025-03-10 ENCOUNTER — APPOINTMENT (OUTPATIENT)
Dept: CARDIOLOGY | Facility: CLINIC | Age: 75
End: 2025-03-10

## 2025-03-12 ENCOUNTER — APPOINTMENT (OUTPATIENT)
Dept: CARDIOLOGY | Facility: CLINIC | Age: 75
End: 2025-03-12

## 2025-03-13 ENCOUNTER — APPOINTMENT (OUTPATIENT)
Dept: CARDIOLOGY | Facility: CLINIC | Age: 75
End: 2025-03-13

## 2025-03-17 ENCOUNTER — APPOINTMENT (OUTPATIENT)
Dept: CARDIOLOGY | Facility: CLINIC | Age: 75
End: 2025-03-17

## 2025-03-19 ENCOUNTER — APPOINTMENT (OUTPATIENT)
Dept: CARDIOLOGY | Facility: CLINIC | Age: 75
End: 2025-03-19

## 2025-03-20 ENCOUNTER — APPOINTMENT (OUTPATIENT)
Dept: CARDIOLOGY | Facility: CLINIC | Age: 75
End: 2025-03-20

## 2025-03-24 ENCOUNTER — APPOINTMENT (OUTPATIENT)
Dept: CARDIOLOGY | Facility: CLINIC | Age: 75
End: 2025-03-24

## 2025-03-26 ENCOUNTER — APPOINTMENT (OUTPATIENT)
Dept: CARDIOLOGY | Facility: CLINIC | Age: 75
End: 2025-03-26

## 2025-03-27 ENCOUNTER — APPOINTMENT (OUTPATIENT)
Dept: CARDIOLOGY | Facility: CLINIC | Age: 75
End: 2025-03-27

## 2025-03-31 ENCOUNTER — APPOINTMENT (OUTPATIENT)
Dept: CARDIOLOGY | Facility: CLINIC | Age: 75
End: 2025-03-31

## 2025-04-02 ENCOUNTER — APPOINTMENT (OUTPATIENT)
Dept: CARDIOLOGY | Facility: CLINIC | Age: 75
End: 2025-04-02

## 2025-04-03 ENCOUNTER — APPOINTMENT (OUTPATIENT)
Dept: CARDIOLOGY | Facility: CLINIC | Age: 75
End: 2025-04-03

## 2025-04-07 ENCOUNTER — APPOINTMENT (OUTPATIENT)
Dept: CARDIOLOGY | Facility: CLINIC | Age: 75
End: 2025-04-07

## 2025-04-09 ENCOUNTER — APPOINTMENT (OUTPATIENT)
Dept: CARDIOLOGY | Facility: CLINIC | Age: 75
End: 2025-04-09

## 2025-04-10 ENCOUNTER — APPOINTMENT (OUTPATIENT)
Dept: CARDIOLOGY | Facility: CLINIC | Age: 75
End: 2025-04-10

## 2025-04-14 ENCOUNTER — APPOINTMENT (OUTPATIENT)
Dept: CARDIOLOGY | Facility: CLINIC | Age: 75
End: 2025-04-14

## 2025-04-16 ENCOUNTER — APPOINTMENT (OUTPATIENT)
Dept: CARDIOLOGY | Facility: CLINIC | Age: 75
End: 2025-04-16

## 2025-04-17 ENCOUNTER — APPOINTMENT (OUTPATIENT)
Dept: CARDIOLOGY | Facility: CLINIC | Age: 75
End: 2025-04-17

## 2025-04-21 ENCOUNTER — APPOINTMENT (OUTPATIENT)
Dept: CARDIOLOGY | Facility: CLINIC | Age: 75
End: 2025-04-21

## 2025-04-23 ENCOUNTER — APPOINTMENT (OUTPATIENT)
Dept: CARDIOLOGY | Facility: CLINIC | Age: 75
End: 2025-04-23

## 2025-04-24 ENCOUNTER — APPOINTMENT (OUTPATIENT)
Dept: CARDIOLOGY | Facility: CLINIC | Age: 75
End: 2025-04-24

## 2025-04-28 ENCOUNTER — APPOINTMENT (OUTPATIENT)
Dept: CARDIOLOGY | Facility: CLINIC | Age: 75
End: 2025-04-28

## 2025-04-30 ENCOUNTER — APPOINTMENT (OUTPATIENT)
Dept: CARDIOLOGY | Facility: CLINIC | Age: 75
End: 2025-04-30

## 2025-05-01 ENCOUNTER — APPOINTMENT (OUTPATIENT)
Dept: CARDIOLOGY | Facility: CLINIC | Age: 75
End: 2025-05-01

## 2025-05-05 ENCOUNTER — APPOINTMENT (OUTPATIENT)
Dept: CARDIOLOGY | Facility: CLINIC | Age: 75
End: 2025-05-05

## 2025-05-07 ENCOUNTER — APPOINTMENT (OUTPATIENT)
Dept: CARDIOLOGY | Facility: CLINIC | Age: 75
End: 2025-05-07

## 2025-05-08 ENCOUNTER — APPOINTMENT (OUTPATIENT)
Dept: CARDIOLOGY | Facility: CLINIC | Age: 75
End: 2025-05-08

## 2025-05-12 ENCOUNTER — APPOINTMENT (OUTPATIENT)
Dept: CARDIOLOGY | Facility: CLINIC | Age: 75
End: 2025-05-12

## 2025-05-14 ENCOUNTER — APPOINTMENT (OUTPATIENT)
Dept: CARDIOLOGY | Facility: CLINIC | Age: 75
End: 2025-05-14

## 2025-05-15 ENCOUNTER — APPOINTMENT (OUTPATIENT)
Dept: CARDIOLOGY | Facility: CLINIC | Age: 75
End: 2025-05-15

## 2025-05-19 ENCOUNTER — APPOINTMENT (OUTPATIENT)
Dept: CARDIOLOGY | Facility: CLINIC | Age: 75
End: 2025-05-19

## 2025-05-21 ENCOUNTER — APPOINTMENT (OUTPATIENT)
Dept: CARDIOLOGY | Facility: CLINIC | Age: 75
End: 2025-05-21

## 2025-05-22 ENCOUNTER — APPOINTMENT (OUTPATIENT)
Dept: CARDIOLOGY | Facility: CLINIC | Age: 75
End: 2025-05-22

## (undated) DEVICE — SAW BLADE MICROAIRE STERNUM 1X34X9.4MM

## (undated) DEVICE — GLV 7.5 PROTEXIS (WHITE)

## (undated) DEVICE — PACK CARDIAC MINOR

## (undated) DEVICE — SOL IRR POUR NS 0.9% 500ML

## (undated) DEVICE — SAW BLADE STRYKER STERNUM 31MM X 6.27 X .79

## (undated) DEVICE — DRAPE INSTRUMENT POUCH 6.75" X 11"

## (undated) DEVICE — SAW BLADE MICROAIRE STERNUM 1.1X50X42MM

## (undated) DEVICE — DRAPE IOBAN 23" X 23"

## (undated) DEVICE — SOL NORMOSOL-R PH7.4 1000ML

## (undated) DEVICE — DRSG STERISTRIPS 0.5 X 4"

## (undated) DEVICE — PACING CABLE TEMP MEDTRONIC WITH PAC-LOC

## (undated) DEVICE — SOL IRR POUR H2O 250ML

## (undated) DEVICE — ELCTR REM POLYHESIVE ADULT PT RETURN 15FT

## (undated) DEVICE — POSITIONER FOAM EGG CRATE ULNAR 2PCS (PINK)

## (undated) DEVICE — BOWL SOL 32OZ LG STRL

## (undated) DEVICE — GLV 8 PROTEXIS (WHITE)

## (undated) DEVICE — DRSG OPSITE 2.5 X 2"

## (undated) DEVICE — GOWN XXXL

## (undated) DEVICE — DRSG OPSITE 13.75 X 4"

## (undated) DEVICE — STEALTH CLAMP INSERT FIBRA/FIBRA 90MM

## (undated) DEVICE — STOPCOCK 3-WAY

## (undated) DEVICE — PREP DURAPREP 26CC

## (undated) DEVICE — SPECIMEN CONTAINER 100ML

## (undated) DEVICE — BAG DECANTER IV STERILE

## (undated) DEVICE — ELCTR GROUNDING PAD ADULT COVIDIEN

## (undated) DEVICE — DRAPE TOWEL BLUE 17" X 24"

## (undated) DEVICE — DRSG TEGADERM 4X4.75"

## (undated) DEVICE — Device

## (undated) DEVICE — DRAPE C ARM UNIVERSAL

## (undated) DEVICE — DRSG TEGADERM 6"X8"

## (undated) DEVICE — MNFLD SETUP

## (undated) DEVICE — ELCTR BOVIE PENCIL HANDPIECE ROCKER SWITCH 15FT

## (undated) DEVICE — WARMING BLANKET FULL UNDERBODY

## (undated) DEVICE — TUBING CONTRAST INJECTION HIGH PRESSURE 1200PSI 72"

## (undated) DEVICE — DRAPE ULTRASOUND TRANSDUCER COVER

## (undated) DEVICE — VESSEL LOOP EXTRA MAXI-BLUE 0.200" X 22"